# Patient Record
Sex: FEMALE | Race: WHITE | NOT HISPANIC OR LATINO | Employment: FULL TIME | URBAN - METROPOLITAN AREA
[De-identification: names, ages, dates, MRNs, and addresses within clinical notes are randomized per-mention and may not be internally consistent; named-entity substitution may affect disease eponyms.]

---

## 2018-01-25 ENCOUNTER — APPOINTMENT (OUTPATIENT)
Dept: OTHER | Facility: IMAGING CENTER | Age: 23
End: 2018-01-25

## 2018-04-02 ENCOUNTER — OFFICE VISIT (OUTPATIENT)
Dept: URGENT CARE | Facility: CLINIC | Age: 23
End: 2018-04-02
Payer: MEDICAID

## 2018-04-02 VITALS
DIASTOLIC BLOOD PRESSURE: 68 MMHG | BODY MASS INDEX: 18.83 KG/M2 | SYSTOLIC BLOOD PRESSURE: 106 MMHG | WEIGHT: 120 LBS | HEART RATE: 70 BPM | OXYGEN SATURATION: 97 % | HEIGHT: 67 IN | RESPIRATION RATE: 14 BRPM | TEMPERATURE: 99.3 F

## 2018-04-02 DIAGNOSIS — J06.9 URI WITH COUGH AND CONGESTION: ICD-10-CM

## 2018-04-02 DIAGNOSIS — M94.0 COSTOCHONDRITIS: ICD-10-CM

## 2018-04-02 PROCEDURE — 94640 AIRWAY INHALATION TREATMENT: CPT | Performed by: PHYSICIAN ASSISTANT

## 2018-04-02 PROCEDURE — 99203 OFFICE O/P NEW LOW 30 MIN: CPT | Mod: 25 | Performed by: PHYSICIAN ASSISTANT

## 2018-04-02 RX ORDER — BUPRENORPHINE 8 MG/1
TABLET SUBLINGUAL DAILY
COMMUNITY
End: 2018-08-21

## 2018-04-02 RX ORDER — METHYLPREDNISOLONE 4 MG/1
4 TABLET ORAL DAILY
Qty: 1 KIT | Refills: 0 | Status: SHIPPED | OUTPATIENT
Start: 2018-04-02 | End: 2018-08-21

## 2018-04-02 RX ORDER — ALBUTEROL SULFATE 90 UG/1
1-2 AEROSOL, METERED RESPIRATORY (INHALATION) EVERY 4 HOURS PRN
Qty: 1 INHALER | Refills: 0 | Status: SHIPPED | OUTPATIENT
Start: 2018-04-02 | End: 2018-04-07

## 2018-04-02 RX ORDER — ALBUTEROL SULFATE 2.5 MG/3ML
2.5 SOLUTION RESPIRATORY (INHALATION) ONCE
Status: COMPLETED | OUTPATIENT
Start: 2018-04-02 | End: 2018-04-02

## 2018-04-02 RX ORDER — QUETIAPINE FUMARATE 50 MG/1
50 TABLET, FILM COATED ORAL 2 TIMES DAILY
COMMUNITY
End: 2018-08-21

## 2018-04-02 RX ADMIN — ALBUTEROL SULFATE 2.5 MG: 2.5 SOLUTION RESPIRATORY (INHALATION) at 18:37

## 2018-04-02 ASSESSMENT — ENCOUNTER SYMPTOMS
WHEEZING: 1
SPUTUM PRODUCTION: 1
SORE THROAT: 1
MYALGIAS: 0
NAUSEA: 0
EYES NEGATIVE: 1
WEIGHT LOSS: 0
COUGH: 1
HEADACHES: 1
VOMITING: 0
ABDOMINAL PAIN: 0
CHILLS: 1
DIZZINESS: 0

## 2018-04-03 NOTE — PATIENT INSTRUCTIONS
Cough, Adult  Introduction  A cough helps to clear your throat and lungs. A cough may last only 2-3 weeks (acute), or it may last longer than 8 weeks (chronic). Many different things can cause a cough. A cough may be a sign of an illness or another medical condition.  Follow these instructions at home:  · Pay attention to any changes in your cough.  · Take medicines only as told by your doctor.  ¨ If you were prescribed an antibiotic medicine, take it as told by your doctor. Do not stop taking it even if you start to feel better.  ¨ Talk with your doctor before you try using a cough medicine.  · Drink enough fluid to keep your pee (urine) clear or pale yellow.  · If the air is dry, use a cold steam vaporizer or humidifier in your home.  · Stay away from things that make you cough at work or at home.  · If your cough is worse at night, try using extra pillows to raise your head up higher while you sleep.  · Do not smoke, and try not to be around smoke. If you need help quitting, ask your doctor.  · Do not have caffeine.  · Do not drink alcohol.  · Rest as needed.  Contact a doctor if:  · You have new problems (symptoms).  · You cough up yellow fluid (pus).  · Your cough does not get better after 2-3 weeks, or your cough gets worse.  · Medicine does not help your cough and you are not sleeping well.  · You have pain that gets worse or pain that is not helped with medicine.  · You have a fever.  · You are losing weight and you do not know why.  · You have night sweats.  Get help right away if:  · You cough up blood.  · You have trouble breathing.  · Your heartbeat is very fast.  This information is not intended to replace advice given to you by your health care provider. Make sure you discuss any questions you have with your health care provider.  Document Released: 08/30/2012 Document Revised: 05/25/2017 Document Reviewed: 02/24/2016  © 2017 Elsevier  Upper Respiratory Infection, Adult  Most upper respiratory  "infections (URIs) are caused by a virus. A URI affects the nose, throat, and upper air passages. The most common type of URI is often called \"the common cold.\"  Follow these instructions at home:  · Take medicines only as told by your doctor.  · Gargle warm saltwater or take cough drops to comfort your throat as told by your doctor.  · Use a warm mist humidifier or inhale steam from a shower to increase air moisture. This may make it easier to breathe.  · Drink enough fluid to keep your pee (urine) clear or pale yellow.  · Eat soups and other clear broths.  · Have a healthy diet.  · Rest as needed.  · Go back to work when your fever is gone or your doctor says it is okay.  ¨ You may need to stay home longer to avoid giving your URI to others.  ¨ You can also wear a face mask and wash your hands often to prevent spread of the virus.  · Use your inhaler more if you have asthma.  · Do not use any tobacco products, including cigarettes, chewing tobacco, or electronic cigarettes. If you need help quitting, ask your doctor.  Contact a doctor if:  · You are getting worse, not better.  · Your symptoms are not helped by medicine.  · You have chills.  · You are getting more short of breath.  · You have brown or red mucus.  · You have yellow or brown discharge from your nose.  · You have pain in your face, especially when you bend forward.  · You have a fever.  · You have puffy (swollen) neck glands.  · You have pain while swallowing.  · You have white areas in the back of your throat.  Get help right away if:  · You have very bad or constant:  ¨ Headache.  ¨ Ear pain.  ¨ Pain in your forehead, behind your eyes, and over your cheekbones (sinus pain).  ¨ Chest pain.  · You have long-lasting (chronic) lung disease and any of the following:  ¨ Wheezing.  ¨ Long-lasting cough.  ¨ Coughing up blood.  ¨ A change in your usual mucus.  · You have a stiff neck.  · You have changes in " your:  ¨ Vision.  ¨ Hearing.  ¨ Thinking.  ¨ Mood.  This information is not intended to replace advice given to you by your health care provider. Make sure you discuss any questions you have with your health care provider.  Document Released: 06/05/2009 Document Revised: 08/20/2017 Document Reviewed: 03/25/2015  Elsevier Interactive Patient Education © 2017 [employer]vier Inc.

## 2018-04-03 NOTE — PROGRESS NOTES
"Subjective:      Eleanor Burr is a 22 y.o. female who presents with URI (uri symptoms x 2 days .)            HPI   Pt presents with mother for URI which stated yesterday. Deep cough which causes Cp. + sputum clear  + sore throat from coughing.  + sinus congestion  States lost her voice  Denies fever but felt hot yesterday with chills  States had flu vacccine     lmp 3/15/2018      Review of Systems   Constitutional: Positive for chills and malaise/fatigue. Negative for weight loss.   HENT: Positive for congestion and sore throat. Negative for ear pain.    Eyes: Negative.    Respiratory: Positive for cough, sputum production and wheezing.    Cardiovascular: Positive for chest pain.        Cp with coughing   Gastrointestinal: Negative for abdominal pain, nausea and vomiting.   Musculoskeletal: Negative for myalgias.   Skin: Positive for rash.   Neurological: Positive for headaches. Negative for dizziness.   Psychiatric/Behavioral:        On court ordered program  Can not have any products with alcohol, cough meds or narcotics          Objective:     /68   Pulse 70   Temp 37.4 °C (99.3 °F)   Resp 14   Ht 1.702 m (5' 7\")   Wt 54.4 kg (120 lb)   LMP 03/15/2018   SpO2 97%   Breastfeeding? No   BMI 18.79 kg/m²      Physical Exam   Constitutional: She appears well-developed and well-nourished.   HENT:   Head: Normocephalic and atraumatic.   Right Ear: External ear normal.   Left Ear: External ear normal.   Eyes: Conjunctivae are normal. Pupils are equal, round, and reactive to light.   Neck: Normal range of motion.   Cardiovascular: Normal rate, regular rhythm and normal heart sounds.    Pulmonary/Chest:   Initial spo2 97% on RA  Few scattered anterior wheezes. Bases decreased with tight breath sounds    Rt tx x 1--> caused increase in cough, no allergic rx noted  Monitored pt. After 5-10 min. Pt felt better  Able to take deep breaths    No wheezing  Bases clear  spo2 99%   Lymphadenopathy:     She " has no cervical adenopathy.   Vitals reviewed.              Assessment/Plan:     1. URI with cough and congestion  albuterol (PROVENTIL) 2.5mg/3ml nebulizer solution 2.5 mg    albuterol 108 (90 Base) MCG/ACT Aero Soln inhalation aerosol    MethylPREDNISolone (MEDROL DOSEPAK) 4 MG Tablet Therapy Pack   2. Costochondritis       Education done on URI and costochondritis. Hand out given  rx for albuterol inhaler with edu  Medrol dose pack  Stay hydrated  Monitor for fever/chills  F/u prn sooner for worsening symptoms

## 2018-07-14 ENCOUNTER — APPOINTMENT (OUTPATIENT)
Dept: RADIOLOGY | Facility: MEDICAL CENTER | Age: 23
End: 2018-07-14
Attending: EMERGENCY MEDICINE
Payer: MEDICAID

## 2018-07-14 ENCOUNTER — HOSPITAL ENCOUNTER (EMERGENCY)
Facility: MEDICAL CENTER | Age: 23
End: 2018-07-14
Attending: EMERGENCY MEDICINE
Payer: MEDICAID

## 2018-07-14 VITALS
OXYGEN SATURATION: 95 % | RESPIRATION RATE: 14 BRPM | TEMPERATURE: 98.3 F | DIASTOLIC BLOOD PRESSURE: 67 MMHG | BODY MASS INDEX: 16.64 KG/M2 | HEART RATE: 79 BPM | WEIGHT: 106.04 LBS | HEIGHT: 67 IN | SYSTOLIC BLOOD PRESSURE: 95 MMHG

## 2018-07-14 DIAGNOSIS — R63.4 WEIGHT LOSS: ICD-10-CM

## 2018-07-14 DIAGNOSIS — G43.001 MIGRAINE WITHOUT AURA AND WITH STATUS MIGRAINOSUS, NOT INTRACTABLE: ICD-10-CM

## 2018-07-14 LAB
ALBUMIN SERPL BCP-MCNC: 3.8 G/DL (ref 3.2–4.9)
ALBUMIN/GLOB SERPL: 1.2 G/DL
ALP SERPL-CCNC: 73 U/L (ref 30–99)
ALT SERPL-CCNC: 9 U/L (ref 2–50)
AMPHETAMINES UR QL: NEGATIVE
ANION GAP SERPL CALC-SCNC: 6 MMOL/L (ref 0–11.9)
APPEARANCE UR: ABNORMAL
APTT PPP: 29.3 SEC (ref 24.7–36)
AST SERPL-CCNC: 18 U/L (ref 12–45)
BACTERIA #/AREA URNS HPF: ABNORMAL /HPF
BARBITURATES UR QL SCN: NEGATIVE
BASOPHILS # BLD AUTO: 0.5 % (ref 0–1.8)
BASOPHILS # BLD: 0.02 K/UL (ref 0–0.12)
BENZODIAZ UR QL SCN: NEGATIVE
BILIRUB SERPL-MCNC: 0.8 MG/DL (ref 0.1–1.5)
BILIRUB UR QL STRIP.AUTO: NEGATIVE
BUN SERPL-MCNC: 7 MG/DL (ref 8–22)
BZE UR QL SCN: NEGATIVE
CALCIUM SERPL-MCNC: 8.7 MG/DL (ref 8.4–10.2)
CHLORIDE SERPL-SCNC: 107 MMOL/L (ref 96–112)
CO2 SERPL-SCNC: 24 MMOL/L (ref 20–33)
COLOR UR: YELLOW
CREAT SERPL-MCNC: 0.7 MG/DL (ref 0.5–1.4)
EOSINOPHIL # BLD AUTO: 0.21 K/UL (ref 0–0.51)
EOSINOPHIL NFR BLD: 5.2 % (ref 0–6.9)
EPI CELLS #/AREA URNS HPF: ABNORMAL /HPF
ERYTHROCYTE [DISTWIDTH] IN BLOOD BY AUTOMATED COUNT: 39.1 FL (ref 35.9–50)
GLOBULIN SER CALC-MCNC: 3.2 G/DL (ref 1.9–3.5)
GLUCOSE SERPL-MCNC: 95 MG/DL (ref 65–99)
GLUCOSE UR STRIP.AUTO-MCNC: NEGATIVE MG/DL
HCG UR QL: NEGATIVE
HCT VFR BLD AUTO: 34.8 % (ref 37–47)
HGB BLD-MCNC: 12.2 G/DL (ref 12–16)
IMM GRANULOCYTES # BLD AUTO: 0 K/UL (ref 0–0.11)
IMM GRANULOCYTES NFR BLD AUTO: 0 % (ref 0–0.9)
INR PPP: 1.14 (ref 0.87–1.13)
KETONES UR STRIP.AUTO-MCNC: NEGATIVE MG/DL
LEUKOCYTE ESTERASE UR QL STRIP.AUTO: NEGATIVE
LIPASE SERPL-CCNC: 26 U/L (ref 7–58)
LYMPHOCYTES # BLD AUTO: 2 K/UL (ref 1–4.8)
LYMPHOCYTES NFR BLD: 49.1 % (ref 22–41)
MCH RBC QN AUTO: 29.6 PG (ref 27–33)
MCHC RBC AUTO-ENTMCNC: 35.1 G/DL (ref 33.6–35)
MCV RBC AUTO: 84.5 FL (ref 81.4–97.8)
MICRO URNS: ABNORMAL
MONOCYTES # BLD AUTO: 0.32 K/UL (ref 0–0.85)
MONOCYTES NFR BLD AUTO: 7.9 % (ref 0–13.4)
MUCOUS THREADS #/AREA URNS HPF: ABNORMAL /HPF
NEUTROPHILS # BLD AUTO: 1.52 K/UL (ref 2–7.15)
NEUTROPHILS NFR BLD: 37.3 % (ref 44–72)
NITRITE UR QL STRIP.AUTO: NEGATIVE
NRBC # BLD AUTO: 0 K/UL
NRBC BLD-RTO: 0 /100 WBC
PCP UR QL SCN: NEGATIVE
PH UR STRIP.AUTO: 7 [PH]
PLATELET # BLD AUTO: 165 K/UL (ref 164–446)
PMV BLD AUTO: 10.1 FL (ref 9–12.9)
POTASSIUM SERPL-SCNC: 4 MMOL/L (ref 3.6–5.5)
PROT SERPL-MCNC: 7 G/DL (ref 6–8.2)
PROT UR QL STRIP: NEGATIVE MG/DL
PROTHROMBIN TIME: 14.5 SEC (ref 12–14.6)
RBC # BLD AUTO: 4.12 M/UL (ref 4.2–5.4)
RBC UR QL AUTO: NEGATIVE
SODIUM SERPL-SCNC: 137 MMOL/L (ref 135–145)
SP GR UR REFRACTOMETRY: 1.02
SP GR UR STRIP.AUTO: 1.02
TSH SERPL DL<=0.005 MIU/L-ACNC: 1.3 UIU/ML (ref 0.38–5.33)
UNIDENT CRYS URNS QL MICRO: ABNORMAL /HPF
UR OPIATES 2659: NEGATIVE
UR THC 2511T: NEGATIVE
UR TRICYCLIC 2660: NEGATIVE
WBC # BLD AUTO: 4.1 K/UL (ref 4.8–10.8)
WBC #/AREA URNS HPF: ABNORMAL /HPF

## 2018-07-14 PROCEDURE — 81025 URINE PREGNANCY TEST: CPT

## 2018-07-14 PROCEDURE — 85610 PROTHROMBIN TIME: CPT

## 2018-07-14 PROCEDURE — 85025 COMPLETE CBC W/AUTO DIFF WBC: CPT

## 2018-07-14 PROCEDURE — 99284 EMERGENCY DEPT VISIT MOD MDM: CPT

## 2018-07-14 PROCEDURE — 70450 CT HEAD/BRAIN W/O DYE: CPT

## 2018-07-14 PROCEDURE — 80053 COMPREHEN METABOLIC PANEL: CPT

## 2018-07-14 PROCEDURE — 700105 HCHG RX REV CODE 258: Performed by: EMERGENCY MEDICINE

## 2018-07-14 PROCEDURE — 80305 DRUG TEST PRSMV DIR OPT OBS: CPT

## 2018-07-14 PROCEDURE — 96375 TX/PRO/DX INJ NEW DRUG ADDON: CPT

## 2018-07-14 PROCEDURE — 87389 HIV-1 AG W/HIV-1&-2 AB AG IA: CPT

## 2018-07-14 PROCEDURE — 84443 ASSAY THYROID STIM HORMONE: CPT

## 2018-07-14 PROCEDURE — 85730 THROMBOPLASTIN TIME PARTIAL: CPT

## 2018-07-14 PROCEDURE — 83690 ASSAY OF LIPASE: CPT

## 2018-07-14 PROCEDURE — 81001 URINALYSIS AUTO W/SCOPE: CPT | Mod: XU

## 2018-07-14 PROCEDURE — 96374 THER/PROPH/DIAG INJ IV PUSH: CPT

## 2018-07-14 PROCEDURE — 700111 HCHG RX REV CODE 636 W/ 250 OVERRIDE (IP): Performed by: EMERGENCY MEDICINE

## 2018-07-14 RX ORDER — SODIUM CHLORIDE 9 MG/ML
1000 INJECTION, SOLUTION INTRAVENOUS ONCE
Status: COMPLETED | OUTPATIENT
Start: 2018-07-14 | End: 2018-07-14

## 2018-07-14 RX ORDER — KETOROLAC TROMETHAMINE 30 MG/ML
15 INJECTION, SOLUTION INTRAMUSCULAR; INTRAVENOUS ONCE
Status: COMPLETED | OUTPATIENT
Start: 2018-07-14 | End: 2018-07-14

## 2018-07-14 RX ORDER — METOCLOPRAMIDE HYDROCHLORIDE 5 MG/ML
10 INJECTION INTRAMUSCULAR; INTRAVENOUS ONCE
Status: COMPLETED | OUTPATIENT
Start: 2018-07-14 | End: 2018-07-14

## 2018-07-14 RX ORDER — ONDANSETRON 2 MG/ML
4 INJECTION INTRAMUSCULAR; INTRAVENOUS ONCE
Status: COMPLETED | OUTPATIENT
Start: 2018-07-14 | End: 2018-07-14

## 2018-07-14 RX ADMIN — KETOROLAC TROMETHAMINE 15 MG: 30 INJECTION, SOLUTION INTRAMUSCULAR at 20:53

## 2018-07-14 RX ADMIN — METOCLOPRAMIDE 10 MG: 5 INJECTION, SOLUTION INTRAMUSCULAR; INTRAVENOUS at 20:21

## 2018-07-14 RX ADMIN — ONDANSETRON 4 MG: 2 INJECTION, SOLUTION INTRAMUSCULAR; INTRAVENOUS at 20:21

## 2018-07-14 RX ADMIN — SODIUM CHLORIDE 1000 ML: 9 INJECTION, SOLUTION INTRAVENOUS at 20:21

## 2018-07-14 ASSESSMENT — PAIN SCALES - WONG BAKER: WONGBAKER_NUMERICALRESPONSE: HURTS JUST A LITTLE BIT

## 2018-07-15 LAB — HIV 1+2 AB+HIV1 P24 AG SERPL QL IA: NON REACTIVE

## 2018-07-15 NOTE — ED NOTES
.  Chief Complaint   Patient presents with   • Weight Loss     50 pound loss in 2 months   • Migraine     every other day initially and now daily   • Nausea   • Dizziness

## 2018-07-15 NOTE — ED PROVIDER NOTES
"ED Provider Note    CHIEF COMPLAINT  Chief Complaint   Patient presents with   • Weight Loss     50 pound loss in 2 months   • Migraine     every other day initially and now daily   • Nausea   • Dizziness       HPI  Eleanor Burr is a 23 y.o. female who presents 50 pounds weight loss over the last 3 months.  Patient states she is also had accompanying headaches for the last 2 or 3 weeks every day.  These headaches feel like her migraine.  She states that they are worse in the mornings.  They are not accompanied by an aura.  She states she has had migraines in the past and this feels similar they are always located over her right eye.  They are gradual in onset.  She has some nausea with these as well as photophobia.  She denies any difficulty speaking, numbness, tingling, weakness, abdominal pain, vomiting ,fever or chills    REVIEW OF SYSTEMS  Pertinent positives include weight loss, headaches. Pertinent negatives include as above. All other systems negative.      PAST MEDICAL HISTORY   has a past medical history of Anxiety; Cold; Psychiatric disorder; and Snoring.    SOCIAL HISTORY  Social History     Social History Main Topics   • Smoking status: Never Smoker   • Smokeless tobacco: Never Used   • Alcohol use No   • Drug use: Yes     Types: Intravenous      Comment: Herioin   • Sexual activity: Not on file       SURGICAL HISTORY   has a past surgical history that includes tonsillectomy and adenoidectomy (1/3/2015).    CURRENT MEDICATIONS  Home Medications    **Home medications have not yet been reviewed for this encounter**         ALLERGIES  Allergies   Allergen Reactions   • Penicillin G Rash       PHYSICAL EXAM  VITAL SIGNS: BP (!) 95/67   Pulse 79   Temp 36.8 °C (98.3 °F)   Resp 14   Ht 1.702 m (5' 7\")   Wt 48.1 kg (106 lb 0.7 oz)   SpO2 95%   BMI 16.61 kg/m²   Constitutional: Well developed, Well nourished, mild distress.   HENT: Normocephalic, Atraumatic, Oropharynx moist, No oral exudates. "   Eyes: Conjunctiva normal, No discharge.   Neck: Supple, No stridor, no meningismus or lymphadenopathy  Cardiovascular: Normal heart rate, Normal rhythm, No murmurs, equal pulses.   Pulmonary: Normal breath sounds, No respiratory distress, No wheezing, No rales, No rhonchi.  Chest: No chest wall tenderness or deformity.   Abdomen:Soft, No tenderness, No masses, no rebound, no guarding.   Back: No CVA tenderness.   Musculoskeletal: No major deformities noted, No tenderness.   Skin: Warm, Dry, No erythema, No rash.   Neurologic: Alert & oriented x 3, Normal motor function,  No focal deficits noted.   Psychiatric: Affect normal, Judgment normal, Mood normal.       RADIOLOGY/PROCEDURES  CT-HEAD W/O   Final Result         1.  No acute intracranial abnormality.          Laboratory tests  Results for orders placed or performed during the hospital encounter of 07/14/18   HCG QUALITATIVE UR (Lab)   Result Value Ref Range    Beta-Hcg Urine Negative Negative   UR DRUG SCREEN([facility] ONLY)   Result Value Ref Range    Phencyclidine -Pcp Negative Negative    Benzodiazepines Negative Negative    Cocaine Metabolite Negative Negative    Amphetamines By Triage Negative Negative    Urine THC Negative Negative    Codeine-Morphine Negative Negative    Barbiturates Negative Negative    Tricyclic Antidepressants Negative Negative   URINALYSIS,CULTURE IF INDICATED   Result Value Ref Range    Color Yellow     Character Hazy (A)     Specific Gravity 1.020 <1.035    Ph 7.0 5.0 - 8.0    Glucose Negative Negative mg/dL    Ketones Negative Negative mg/dL    Protein Negative Negative mg/dL    Bilirubin Negative Negative    Nitrite Negative Negative    Leukocyte Esterase Negative Negative    Occult Blood Negative Negative    Micro Urine Req Microscopic    REFRACTOMETER SG   Result Value Ref Range    Specific Gravity 1.020    CBC WITH DIFFERENTIAL   Result Value Ref Range    WBC 4.1 (L) 4.8 - 10.8 K/uL    RBC 4.12 (L) 4.20 - 5.40 M/uL     Hemoglobin 12.2 12.0 - 16.0 g/dL    Hematocrit 34.8 (L) 37.0 - 47.0 %    MCV 84.5 81.4 - 97.8 fL    MCH 29.6 27.0 - 33.0 pg    MCHC 35.1 (H) 33.6 - 35.0 g/dL    RDW 39.1 35.9 - 50.0 fL    Platelet Count 165 164 - 446 K/uL    MPV 10.1 9.0 - 12.9 fL    Neutrophils-Polys 37.30 (L) 44.00 - 72.00 %    Lymphocytes 49.10 (H) 22.00 - 41.00 %    Monocytes 7.90 0.00 - 13.40 %    Eosinophils 5.20 0.00 - 6.90 %    Basophils 0.50 0.00 - 1.80 %    Immature Granulocytes 0.00 0.00 - 0.90 %    Nucleated RBC 0.00 /100 WBC    Neutrophils (Absolute) 1.52 (L) 2.00 - 7.15 K/uL    Lymphs (Absolute) 2.00 1.00 - 4.80 K/uL    Monos (Absolute) 0.32 0.00 - 0.85 K/uL    Eos (Absolute) 0.21 0.00 - 0.51 K/uL    Baso (Absolute) 0.02 0.00 - 0.12 K/uL    Immature Granulocytes (abs) 0.00 0.00 - 0.11 K/uL    NRBC (Absolute) 0.00 K/uL   APTT   Result Value Ref Range    APTT 29.3 24.7 - 36.0 sec   PROTHROMBIN TIME   Result Value Ref Range    PT 14.5 12.0 - 14.6 sec    INR 1.14 (H) 0.87 - 1.13   LIPASE   Result Value Ref Range    Lipase 26 7 - 58 U/L   COMP METABOLIC PANEL   Result Value Ref Range    Sodium 137 135 - 145 mmol/L    Potassium 4.0 3.6 - 5.5 mmol/L    Chloride 107 96 - 112 mmol/L    Co2 24 20 - 33 mmol/L    Anion Gap 6.0 0.0 - 11.9    Glucose 95 65 - 99 mg/dL    Bun 7 (L) 8 - 22 mg/dL    Creatinine 0.70 0.50 - 1.40 mg/dL    Calcium 8.7 8.4 - 10.2 mg/dL    AST(SGOT) 18 12 - 45 U/L    ALT(SGPT) 9 2 - 50 U/L    Alkaline Phosphatase 73 30 - 99 U/L    Total Bilirubin 0.8 0.1 - 1.5 mg/dL    Albumin 3.8 3.2 - 4.9 g/dL    Total Protein 7.0 6.0 - 8.2 g/dL    Globulin 3.2 1.9 - 3.5 g/dL    A-G Ratio 1.2 g/dL   TSH   Result Value Ref Range    TSH 1.300 0.380 - 5.330 uIU/mL   URINE MICROSCOPIC (W/UA)   Result Value Ref Range    WBC 2-5 /hpf    Bacteria Moderate (A) None /hpf    Epithelial Cells Many (A) Few /hpf    Mucous Threads Few /hpf    Urine Crystals Few Amorphous /hpf   ESTIMATED GFR   Result Value Ref Range    GFR If  >60 >60  mL/min/1.73 m 2    GFR If Non African American >60 >60 mL/min/1.73 m 2         Medications given in the ER  Medications   NS infusion 1,000 mL (0 mL Intravenous Stopped 7/14/18 2225)   ketorolac (TORADOL) injection 15 mg (15 mg Intravenous Given 7/14/18 2053)   ondansetron (ZOFRAN) syringe/vial injection 4 mg (4 mg Intravenous Given 7/14/18 2021)   metoclopramide (REGLAN) injection 10 mg (10 mg Intravenous Given 7/14/18 2021)       COURSE & MEDICAL DECISION MAKING  Pertinent Labs & Imaging studies reviewed. (See chart for details)  Differential includes malignancy, brain tumor, migraine, electrolyte abnormality.  Hyperthyroidism, HIV  IV fluids were given for migraine cocktail. Patient responded appropriately leaf of her headache.    Patient's IV infiltrated during CT of the chest abdomen pelvis.  Patient refuses all further imaging or replacement of the IV.  I have discussed with her the options of doing a head CT without contrast which may not be ideal since we are looking for possible cause for weight loss such as a tumor.  At this point time patient would rather follow-up as an outpatient with her primary at the [facility] and get the CT done on another day.  Patient has a history of IV drug use.  HIV has been sent out but will not be back tonight.  I will have the patient follow-up with her primary on these results she understands this.      Medical decision making: Patient presents with 50 pounds weight loss in last 2-3 months I was concerned for possible malignancy at this point time her labs do not show any signs of a leukemia.  CT of the head is negative for any mass.  Her headache is gone away with migraine cocktail.  She did not have a fever this headache was not thunderclap in nature I do not think this is subarachnoid hemorrhage or meningitis.  I will have the patient continue to follow-up with her primary for further evaluation with outpatient imaging.    The patient will return for new or worsening  symptoms and is stable at the time of discharge.    The patient is referred to a primary physician for blood pressure management, diabetic screening, and for all other preventative health concerns.        DISPOSITION:  Patient will be discharged home in stable condition.    FOLLOW UP:  [address] [address]  [phone]  Schedule an appointment as soon as possible for a visit in 1 week        OUTPATIENT MEDICATIONS:  Discharge Medication List as of 7/14/2018 10:26 PM          FINAL IMPRESSION  1. Migraine without aura and with status migrainosus, not intractable    2. Weight loss               Electronically signed by: Alberto Rosa, 7/14/2018 7:36 PM  This record was made with a voice recognition software. The software is not perfect. I have tried to correct any grammar, spelling or context errors to the best of my ability, but errors may still remain. Interpretation of this chart should be taken in this context.

## 2018-07-15 NOTE — ED NOTES
Pt returned from ct scan, iv line placed under u/s guidance infiltrated as per ct tech, iv removed , pt refusing any additional iv attempts,ed md made aware, will speak with pt.

## 2018-07-15 NOTE — ED NOTES
Pt dc'd home with instructions for further f/u at [facility], pt verbalized understanding, opportunity provided to ask questions, pt ambulated out of ed with steady gait with mother.

## 2018-07-15 NOTE — DISCHARGE INSTRUCTIONS
He will likely need a further workup with possible CT of the chest abdomen pelvis and possible MRI of your brain to make sure that there is not a cancer causing your weight loss.  Your HIV test is pending have your doctor follow-up on this in 3 days.     Return the emergency department if you have new or different headache, confusion, visual changes, difficulty speaking fever or neck stiffness.      Migraine Headache  A migraine headache is an intense, throbbing pain on one side or both sides of the head. Migraines may also cause other symptoms, such as nausea, vomiting, and sensitivity to light and noise.  What are the causes?  Doing or taking certain things may also trigger migraines, such as:  · Alcohol.  · Smoking.  · Medicines, such as:  ¨ Medicine used to treat chest pain (nitroglycerine).  ¨ Birth control pills.  ¨ Estrogen pills.  ¨ Certain blood pressure medicines.  · Aged cheeses, chocolate, or caffeine.  · Foods or drinks that contain nitrates, glutamate, aspartame, or tyramine.  · Physical activity.  Other things that may trigger a migraine include:  · Menstruation.  · Pregnancy.  · Hunger.  · Stress, lack of sleep, too much sleep, or fatigue.  · Weather changes.  What increases the risk?  The following factors may make you more likely to experience migraine headaches:  · Age. Risk increases with age.  · Family history of migraine headaches.  · Being .  · Depression and anxiety.  · Obesity.  · Being a woman.  · Having a hole in the heart (patent foramen ovale) or other heart problems.  What are the signs or symptoms?  The main symptom of this condition is pulsating or throbbing pain. Pain may:  · Happen in any area of the head, such as on one side or both sides.  · Interfere with daily activities.  · Get worse with physical activity.  · Get worse with exposure to bright lights or loud noises.  Other symptoms may include:  · Nausea.  · Vomiting.  · Dizziness.  · General sensitivity to bright  lights, loud noises, or smells.  Before you get a migraine, you may get warning signs that a migraine is developing (aura). An aura may include:  · Seeing flashing lights or having blind spots.  · Seeing bright spots, halos, or zigzag lines.  · Having tunnel vision or blurred vision.  · Having numbness or a tingling feeling.  · Having trouble talking.  · Having muscle weakness.  How is this diagnosed?  A migraine headache can be diagnosed based on:  · Your symptoms.  · A physical exam.  · Tests, such as CT scan or MRI of the head. These imaging tests can help rule out other causes of headaches.  · Taking fluid from the spine (lumbar puncture) and analyzing it (cerebrospinal fluid analysis, or CSF analysis).  How is this treated?  A migraine headache is usually treated with medicines that:  · Relieve pain.  · Relieve nausea.  · Prevent migraines from coming back.  Treatment may also include:  · Acupuncture.  · Lifestyle changes like avoiding foods that trigger migraines.  Follow these instructions at home:  Medicines  · Take over-the-counter and prescription medicines only as told by your health care provider.  · Do not drive or use heavy machinery while taking prescription pain medicine.  · To prevent or treat constipation while you are taking prescription pain medicine, your health care provider may recommend that you:  ¨ Drink enough fluid to keep your urine clear or pale yellow.  ¨ Take over-the-counter or prescription medicines.  ¨ Eat foods that are high in fiber, such as fresh fruits and vegetables, whole grains, and beans.  ¨ Limit foods that are high in fat and processed sugars, such as fried and sweet foods.  Lifestyle  · Avoid alcohol use.  · Do not use any products that contain nicotine or tobacco, such as cigarettes and e-cigarettes. If you need help quitting, ask your health care provider.  · Get at least 8 hours of sleep every night.  · Limit your stress.  General instructions  · Keep a journal to find  out what may trigger your migraine headaches. For example, write down:  ¨ What you eat and drink.  ¨ How much sleep you get.  ¨ Any change to your diet or medicines.  · If you have a migraine:  ¨ Avoid things that make your symptoms worse, such as bright lights.  ¨ It may help to lie down in a dark, quiet room.  ¨ Do not drive or use heavy machinery.  ¨ Ask your health care provider what activities are safe for you while you are experiencing symptoms.  · Keep all follow-up visits as told by your health care provider. This is important.  Contact a health care provider if:  · You develop symptoms that are different or more severe than your usual migraine symptoms.  Get help right away if:  · Your migraine becomes severe.  · You have a fever.  · You have a stiff neck.  · You have vision loss.  · Your muscles feel weak or like you cannot control them.  · You start to lose your balance often.  · You develop trouble walking.  · You faint.  This information is not intended to replace advice given to you by your health care provider. Make sure you discuss any questions you have with your health care provider.  Document Released: 12/18/2006 Document Revised: 07/07/2017 Document Reviewed: 06/05/2017  [employer] Interactive Patient Education © 2017 [employer] Inc.

## 2018-08-21 ENCOUNTER — HOSPITAL ENCOUNTER (EMERGENCY)
Facility: MEDICAL CENTER | Age: 23
End: 2018-08-22
Attending: EMERGENCY MEDICINE
Payer: MEDICAID

## 2018-08-21 ENCOUNTER — APPOINTMENT (OUTPATIENT)
Dept: RADIOLOGY | Facility: MEDICAL CENTER | Age: 23
End: 2018-08-21
Attending: EMERGENCY MEDICINE
Payer: MEDICAID

## 2018-08-21 DIAGNOSIS — I80.9 THROMBOPHLEBITIS: ICD-10-CM

## 2018-08-21 DIAGNOSIS — I82.622 ACUTE DEEP VEIN THROMBOSIS (DVT) OF RADIAL VEIN OF LEFT UPPER EXTREMITY (HCC): ICD-10-CM

## 2018-08-21 LAB — EKG IMPRESSION: NORMAL

## 2018-08-21 PROCEDURE — 93005 ELECTROCARDIOGRAM TRACING: CPT

## 2018-08-21 PROCEDURE — 99284 EMERGENCY DEPT VISIT MOD MDM: CPT

## 2018-08-21 PROCEDURE — 71045 X-RAY EXAM CHEST 1 VIEW: CPT

## 2018-08-21 PROCEDURE — 93005 ELECTROCARDIOGRAM TRACING: CPT | Performed by: EMERGENCY MEDICINE

## 2018-08-21 RX ORDER — NAPROXEN 500 MG/1
500 TABLET ORAL 2 TIMES DAILY WITH MEALS
COMMUNITY
End: 2018-11-12

## 2018-08-21 RX ORDER — IBUPROFEN 800 MG/1
800 TABLET ORAL EVERY 8 HOURS PRN
COMMUNITY
End: 2018-11-12

## 2018-08-21 RX ORDER — OXCARBAZEPINE 150 MG/1
90 TABLET, FILM COATED ORAL DAILY
COMMUNITY
End: 2020-02-21

## 2018-08-21 ASSESSMENT — PAIN SCALES - GENERAL: PAINLEVEL_OUTOF10: 10

## 2018-08-22 VITALS
HEIGHT: 67 IN | WEIGHT: 103.4 LBS | BODY MASS INDEX: 16.23 KG/M2 | SYSTOLIC BLOOD PRESSURE: 104 MMHG | DIASTOLIC BLOOD PRESSURE: 74 MMHG | TEMPERATURE: 99.4 F | HEART RATE: 72 BPM | OXYGEN SATURATION: 98 % | RESPIRATION RATE: 24 BRPM

## 2018-08-22 PROCEDURE — 93971 EXTREMITY STUDY: CPT

## 2018-08-22 RX ORDER — SULFAMETHOXAZOLE AND TRIMETHOPRIM 800; 160 MG/1; MG/1
1 TABLET ORAL 2 TIMES DAILY
Qty: 14 TAB | Refills: 0 | Status: SHIPPED | OUTPATIENT
Start: 2018-08-22 | End: 2018-08-29

## 2018-08-22 NOTE — ED NOTES
All lines and monitors discontinued. Discharge instructions given, questions answered.    Ambulated out of ER, escorted by RN.  Instructed not to drive after taking pain medication and pt verbalizes understanding.  Rx x 3 given.

## 2018-08-22 NOTE — ED TRIAGE NOTES
[facility]  23 [facility]  Chief Complaint   Patient presents with   • Chest Pain     x 2-3 hours, midsternal, worse with deep inspiration, rates pain 3/10   • Shortness of Breath     concurrent with chest pain   • Arm Swelling     LEFT FOREARM, worsening x 2-3 days, rates pain 10/10, noted to be red/swollen     Ambulatory to triage with steady gait for above.    States that forearm redness/swelling started 3 days ago and has been progressively worsening since. Now today has 10/10 pain to site. 2-3 hours ago developed chest pain/SOB.    States \"I think I have a blood clot. I want to make sure that it didn't travel to my lungs. I wasn't going to come but now I'm really concerned that I have chest pain and shortness of breath.\"    Patient is a daily pack-a-day smoker.    EKG called for in triage per protocol.    Discussed with Charge FLACO Armando.  "

## 2018-08-22 NOTE — ED PROVIDER NOTES
ED Provider Note    Scribed for Ghislaine Awan M.D. by Li Owens. 8/21/2018, 10:13 PM.    Primary care provider: Pcp Pt States None  Means of arrival: walk-in  History obtained from: Patient  History limited by: none    CHIEF COMPLAINT  Chief Complaint   Patient presents with   • Chest Pain     x 2-3 hours, midsternal, worse with deep inspiration, rates pain 3/10   • Shortness of Breath     concurrent with chest pain   • Arm Swelling     LEFT FOREARM, worsening x 2-3 days, rates pain 10/10, noted to be red/swollen       HPI  Eleanor Burr is a 23 y.o. female who presents to the Emergency Department for left forearm swelling, pain and redness that was first noticed this morning. She denies any recent trauma to the extremity. Patient denies currently using IV drugs, however, she has in the past, with her last use being a few years ago. No complaints of fever. Patient is also reporting mild epigastric pain as well, however, she reports that she is a regular smoker, and believes this discomfort to be secondary to that. No complaints of any difficulty breathing.   Patient was diagnosed with a UTI last week, being prescribed with Macrobid, however, she has not started this regimen.     REVIEW OF SYSTEMS  Positives as above. Pertinent negatives include fever, difficulty breathing   All other review of systems are negative    PAST MEDICAL HISTORY   has a past medical history of Anxiety; Cold; Gastric ulcer; Psychiatric disorder; Smoker; and Snoring.    SURGICAL HISTORY   has a past surgical history that includes tonsillectomy and adenoidectomy (1/3/2015) and dental extraction(s).    SOCIAL HISTORY  Social History   Substance Use Topics   • Smoking status: Current Every Day Smoker     Packs/day: 1.00     Types: Cigarettes   • Smokeless tobacco: Never Used   • Alcohol use No      History   Drug Use No       FAMILY HISTORY  History reviewed. No pertinent family history.    CURRENT MEDICATIONS  Reviewed. See  "Encounter Summary.     ALLERGIES  Allergies   Allergen Reactions   • Penicillin G Rash       PHYSICAL EXAM  VITAL SIGNS: /74   Pulse 72   Temp 37.4 °C (99.4 °F)   Resp 20   Ht 1.702 m (5' 7\")   Wt 46.9 kg (103 lb 6.3 oz)   SpO2 98%   BMI 16.19 kg/m²     Pulse ox interpretation: I interpret this pulse ox as normal.  Constitutional: Alert in no apparent distress.  HENT: Normocephalic atraumatic, MMM  Eyes: PERR, Conjunctiva normal, Non-icteric.   Neck: Normal range of motion, No tenderness, Supple, No stridor.   Cardiovascular: Regular rate and rhythm, no murmurs.   Thorax & Lungs: Normal breath sounds, No respiratory distress, No wheezing, No chest tenderness.   Abdomen:Soft, No tenderness, No pulsatile masses. No peritoneal signs.  Skin: Warm, Dry  Extremities: Intact distal pulses, left forearm ferguson aspect, hard superficial probable occluded vein, with surrounding erythema, warmth and induration, No cyanosis  Musculoskeletal: Good range of motion in all major joints.  Neurologic: Alert and oriented, No focal deficits noted.     DIFFERENTIAL DIAGNOSIS AND WORK UP PLAN    10:13 PM Patient seen and examined at bedside. The patient presents with redness, pain and swelling along the left forearm and the differential diagnosis includes but is not limited to thrombophlebitis, cellulitis, abscess, low concern for PE or ACS. Ordered for UE venous duplex, chest xray to evaluate. I informed the patient that we would evaluate for acute origins of her symptoms with imaging. She understands and agrees. Patient denies any difficulty breathing, and her chest discomfort is more in her epigastric area, supported by her as being regular discomfort experienced secondary to her heavy smoking. Patient is PERC negative at this time.  She is refusing any lab work.    DIAGNOSTIC STUDIES / PROCEDURES     EKG  12- Lead EKG; interpreted by myself - Emperatriz  Normal sinus rhythm with a rate of 74 bpm.   Normal axis.  Normal " "intervals.   No ST elevation or depression, or abnormal T wave inversion   No widening of QRS complex   Good R wave progression   No diagnostic Q waves.   prior EKG T wave inversions V2 and V3 are not present today  Clinical Impression: normal EKG       RADIOLOGY  UE VENOUS DUPLEX (Specify in Comments Left, Right Or Bilateral)         DX-CHEST-LIMITED (1 VIEW)   Final Result         1.  No acute cardiopulmonary disease.        The radiologist's interpretation of all radiological studies have been reviewed by me.    COURSE & MEDICAL DECISION MAKING  Pertinent Labs & Imaging studies reviewed. (See chart for details)    12:39 AM I re-evaluated patient at bedside and informed her of her results. I advised that we should continue work up for PE, however, patient is refusing any lab work or admission at this time. Patient is experiencing no chest pain or shortness of breath currently, she is not tachycardic or hypoxic.  She is continuing to refuse any type of IV treatment or workup including labs or CT scan at this time, patient will be placed on blood thinners as well as antibiotics and advised warm compresses at home. He understands and agrees with treatment plan and discharge.    This is a 23 y.o. year old female who presents with normal vital signs and a history of IV drug use within the last 5 years for evaluation of redness, swelling and pain along the left forearm, first noticed this morning. Patient's US shows deep and peripheral occlusions with thrombophlebitis. She is refusing admission or further lab work up for possible PE, I counseled her on the risks of this, however, she still refuses. Patient will be placed on blood thinners to begin management, as well as antibiotics for the current infection. She will be discharge given strict return precautions and follow up with PCP for tomorrow.    /88   Pulse 91   Temp 37.4 °C (99.4 °F)   Resp (!) 24   Ht 1.702 m (5' 7\")   Wt 46.9 kg (103 lb 6.3 oz)   " SpO2 98%   BMI 16.19 kg/m²        The patient will return for new or worsening symptoms and is stable at the time of discharge.    DISPOSITION:  Patient will be discharged home in stable condition.    FOLLOW UP:  with your primary care department    Schedule an appointment as soon as possible for a visit  call later this morning    [facility], Emergency Dept  [address]  [place] [id number] [phone]    If symptoms worsen - chest pain difficulty breathing      OUTPATIENT MEDICATIONS:  Discharge Medication List as of 8/22/2018 12:30 AM      START taking these medications    Details   sulfamethoxazole-trimethoprim (BACTRIM DS) 800-160 MG tablet Take 1 Tab by mouth 2 times a day for 7 days., Disp-14 Tab, R-0, Print Rx Paper      !! rivaroxaban (XARELTO) 15 MG Tab tablet Take 1 Tab by mouth 2 times a day, with meals for 21 days., Disp-42 Tab, R-0, Print Rx Paper      !! rivaroxaban (XARELTO) 20 MG Tab tablet Take 1 Tab by mouth with dinner for 30 days.To be started after 15mg twice a day is completedDisp-30 Tab, R-0, Print Rx Paper       !! - Potential duplicate medications found. Please discuss with provider.          FINAL IMPRESSION  1. Thrombophlebitis    2. Acute deep vein thrombosis (DVT) of radial vein of left upper extremity (HCC)          Li FRAIRE (Scribe), am scribing for, and in the presence of, Ghislaine Awan M.D..    Electronically signed by: Li Owens ([place]), 8/21/2018    Ghislaine FRAIRE M.D. personally performed the services described in this documentation, as scribed by Li Owens in my presence, and it is both accurate and complete.    The note accurately reflects work and decisions made by me.  Ghislaine Awan  8/22/2018  4:23 AM    This dictation has been created using voice recognition software and/or scribes. The accuracy of the dictation is limited by the abilities of the software and the expertise of the scribes. I expect there may  be some errors of grammar and possibly content. I made every attempt to manually correct the errors within my dictation. However, errors related to voice recognition software and/or scribes may still exist and should be interpreted within the appropriate context.

## 2018-08-22 NOTE — ED NOTES
Pt refusing IV, threatening to leave AMA. RN attempted to educate pt on need for IV and blood work. PT still refusing.

## 2018-08-22 NOTE — DISCHARGE INSTRUCTIONS
Deep Vein Thrombosis  A deep vein thrombosis (DVT) is a blood clot (thrombus) that usually occurs in a deep, larger vein of the lower leg or the pelvis, or in an upper extremity such as the arm. These are dangerous and can lead to serious and even life-threatening complications if the clot travels to the lungs.  A DVT can damage the valves in your leg veins so that instead of flowing upward, the blood pools in the lower leg. This is called post-thrombotic syndrome, and it can result in pain, swelling, discoloration, and sores on the leg.  What are the causes?  A DVT is caused by the formation of a blood clot in your leg, pelvis, or arm. Usually, several things contribute to the formation of blood clots. A clot may develop when:  · Your blood flow slows down.  · Your vein becomes damaged in some way.  · You have a condition that makes your blood clot more easily.  What increases the risk?  A DVT is more likely to develop in:  · People who are older, especially over 60 years of age.  · People who are overweight (obese).  · People who sit or lie still for a long time, such as during long-distance travel (over 4 hours), bed rest, hospitalization, or during recovery from certain medical conditions like a stroke.  · People who do not engage in much physical activity (sedentary lifestyle).  · People who have chronic breathing disorders.  · People who have a personal or family history of blood clots or blood clotting disease.  · People who have peripheral vascular disease (PVD), diabetes, or some types of cancer.  · People who have heart disease, especially if the person had a recent heart attack or has congestive heart failure.  · People who have neurological diseases that affect the legs (leg paresis).  · People who have had a traumatic injury, such as breaking a hip or leg.  · People who have recently had major or lengthy surgery, especially on the hip, knee, or abdomen.  · People who have had a central line placed  inside a large vein.  · People who take medicines that contain the hormone estrogen. These include birth control pills and hormone replacement therapy.  · Pregnancy or during childbirth or the postpartum period.  · Long plane flights (over 8 hours).  What are the signs or symptoms?     Symptoms of a DVT can include:  · Swelling of your leg or arm, especially if one side is much worse.  · Warmth and redness of your leg or arm, especially if one side is much worse.  · Pain in your arm or leg. If the clot is in your leg, symptoms may be more noticeable or worse when you stand or walk.  · A feeling of pins and needles, if the clot is in the arm.  The symptoms of a DVT that has traveled to the lungs (pulmonary embolism, PE) usually start suddenly and include:  · Shortness of breath while active or at rest.  · Coughing or coughing up blood or blood-tinged mucus.  · Chest pain that is often worse with deep breaths.  · Rapid or irregular heartbeat.  · Feeling light-headed or dizzy.  · Fainting.  · Feeling anxious.  · Sweating.  There may also be pain and swelling in a leg if that is where the blood clot started.  These symptoms may represent a serious problem that is an emergency. Do not wait to see if the symptoms will go away. Get medical help right away. Call your local emergency services (911 in the [place] Do not drive yourself to the hospital.   How is this diagnosed?  Your health care provider will take a medical history and perform a physical exam. You may also have other tests, including:  · Blood tests to assess the clotting properties of your blood.  · Imaging tests, such as CT, ultrasound, MRI, X-ray, and other tests to see if you have clots anywhere in your body.  How is this treated?  After a DVT is identified, it can be treated. The type of treatment that you receive depends on many factors, such as the cause of your DVT, your risk for bleeding or developing more clots, and other medical conditions that you  have. Sometimes, a combination of treatments is necessary. Treatment options may be combined and include:  · Monitoring the blood clot with ultrasound.  · Taking medicines by mouth, such as newer blood thinners (anticoagulants), thrombolytics, or warfarin.  · Taking anticoagulant medicine by injection or through an IV tube.  · Wearing compression stockings or using different types of devices.  · Surgery (rare) to remove the blood clot or to place a filter in your abdomen to stop the blood clot from traveling to your lungs.  Treatments for a DVT are often divided into immediate treatment and long-term treatment (up to 3 months after DVT). You can work with your health care provider to choose the treatment program that is best for you.  Follow these instructions at home:  If you are taking a newer oral anticoagulant:  · Take the medicine every single day at the same time each day.  · Understand what foods and drugs interact with this medicine.  · Understand that there are no regular blood tests required when using this medicine.  · Understand the side effects of this medicine, including excessive bruising or bleeding. Ask your health care provider or pharmacist about other possible side effects.  If you are taking warfarin:  · Understand how to take warfarin and know which foods can affect how warfarin works in your body.  · Understand that it is dangerous to take too much or too little warfarin. Too much warfarin increases the risk of bleeding. Too little warfarin continues to allow the risk for blood clots.  · Follow your PT and INR blood testing schedule. The PT and INR results allow your health care provider to adjust your dose of warfarin. It is very important that you have your PT and INR tested as often as told by your health care provider.  · Avoid major changes in your diet, or tell your health care provider before you change your diet. Arrange a visit with a registered dietitian to answer your questions.  Many foods, especially foods that are high in vitamin K, can interfere with warfarin and affect the PT and INR results. Eat a consistent amount of foods that are high in vitamin K, such as:  ¨ Spinach, kale, broccoli, cabbage, leticia greens, turnip greens, [place] sprouts, peas, cauliflower, seaweed, and parsley.  ¨ Beef liver and pork liver.  ¨ Green tea.  ¨ Soybean oil.  · Tell your health care provider about any and all medicines, vitamins, and supplements that you take, including aspirin and other over-the-counter anti-inflammatory medicines. Be especially cautious with aspirin and anti-inflammatory medicines. Do not take those before you ask your health care provider if it is safe to do so. This is important because many medicines can interfere with warfarin and affect the PT and INR results.  · Do not start or stop taking any over-the-counter or prescription medicine unless your health care provider or pharmacist tells you to do so.  If you take warfarin, you will also need to do these things:  · Hold pressure over cuts for longer than usual.  · Tell your dentist and other health care providers that you are taking warfarin before you have any procedures in which bleeding may occur.  · Avoid alcohol or drink very small amounts. Tell your health care provider if you change your alcohol intake.  · Do not use tobacco products, including cigarettes, chewing tobacco, and e-cigarettes. If you need help quitting, ask your health care provider.  · Avoid contact sports.  General instructions  · Take over-the-counter and prescription medicines only as told by your health care provider. Anticoagulant medicines can have side effects, including easy bruising and difficulty stopping bleeding. If you are prescribed an anticoagulant, you will also need to do these things:  ¨ Hold pressure over cuts for longer than usual.  ¨ Tell your dentist and other health care providers that you are taking anticoagulants before you have  any procedures in which bleeding may occur.  ¨ Avoid contact sports.  · Wear a medical alert bracelet or carry a medical alert card that says you have had a PE.  · Ask your health care provider how soon you can go back to your normal activities. Stay active to prevent new blood clots from forming.  · Make sure to exercise while traveling or when you have been sitting or standing for a long period of time. It is very important to exercise. Exercise your legs by walking or by tightening and relaxing your leg muscles often. Take frequent walks.  · Wear compression stockings as told by your health care provider to help prevent more blood clots from forming.  · Do not use tobacco products, including cigarettes, chewing tobacco, and e-cigarettes. If you need help quitting, ask your health care provider.  · Keep all follow-up appointments with your health care provider. This is important.  How is this prevented?  Take these actions to decrease your risk of developing another DVT:  · Exercise regularly. For at least 30 minutes every day, engage in:  ¨ Activity that involves moving your arms and legs.  ¨ Activity that encourages good blood flow through your body by increasing your heart rate.  · Exercise your arms and legs every hour during long-distance travel (over 4 hours). Drink plenty of water and avoid drinking alcohol while traveling.  · Avoid sitting or lying in bed for long periods of time without moving your legs.  · Maintain a weight that is appropriate for your height. Ask your health care provider what weight is healthy for you.  · If you are a woman who is over 35 years of age, avoid unnecessary use of medicines that contain estrogen. These include birth control pills.  · Do not smoke, especially if you take estrogen medicines. If you need help quitting, ask your health care provider.  If you are hospitalized, prevention measures may include:  · Early walking after surgery, as soon as your health care provider  says that it is safe.  · Receiving anticoagulants to prevent blood clots. If you cannot take anticoagulants, other options may be available, such as wearing compression stockings or using different types of devices.  Get help right away if:  · You have new or increased pain, swelling, or redness in an arm or leg.  · You have numbness or tingling in an arm or leg.  · You have shortness of breath while active or at rest.  · You have chest pain.  · You have a rapid or irregular heartbeat.  · You feel light-headed or dizzy.  · You cough up blood.  · You notice blood in your vomit, bowel movement, or urine.  These symptoms may represent a serious problem that is an emergency. Do not wait to see if the symptoms will go away. Get medical help right away. Call your local emergency services (911 in the [place] Do not drive yourself to the hospital.   This information is not intended to replace advice given to you by your health care provider. Make sure you discuss any questions you have with your health care provider.  Document Released: 12/18/2006 Document Revised: 05/25/2017 Document Reviewed: 04/13/2016  [employer] Interactive Patient Education © 2017 [employer]

## 2018-11-12 ENCOUNTER — OFFICE VISIT (OUTPATIENT)
Dept: MEDICAL GROUP | Facility: MEDICAL CENTER | Age: 23
End: 2018-11-12
Attending: FAMILY MEDICINE
Payer: MEDICAID

## 2018-11-12 VITALS
HEART RATE: 60 BPM | DIASTOLIC BLOOD PRESSURE: 60 MMHG | BODY MASS INDEX: 15.7 KG/M2 | TEMPERATURE: 97.7 F | RESPIRATION RATE: 14 BRPM | HEIGHT: 67 IN | OXYGEN SATURATION: 97 % | WEIGHT: 100 LBS | SYSTOLIC BLOOD PRESSURE: 100 MMHG

## 2018-11-12 DIAGNOSIS — Z72.0 TOBACCO ABUSE: ICD-10-CM

## 2018-11-12 DIAGNOSIS — M51.26 LUMBAR DISC HERNIATION: ICD-10-CM

## 2018-11-12 DIAGNOSIS — B18.2 HEP C W/ COMA, CHRONIC: ICD-10-CM

## 2018-11-12 DIAGNOSIS — M79.89 SWELLING OF BOTH UPPER EXTREMITIES: ICD-10-CM

## 2018-11-12 DIAGNOSIS — Z87.898 HISTORY OF SEIZURES: ICD-10-CM

## 2018-11-12 DIAGNOSIS — G43.009 MIGRAINE WITHOUT AURA AND WITHOUT STATUS MIGRAINOSUS, NOT INTRACTABLE: ICD-10-CM

## 2018-11-12 DIAGNOSIS — R10.9 ABDOMINAL PAIN, UNSPECIFIED ABDOMINAL LOCATION: ICD-10-CM

## 2018-11-12 DIAGNOSIS — I82.622: ICD-10-CM

## 2018-11-12 PROCEDURE — 99213 OFFICE O/P EST LOW 20 MIN: CPT | Performed by: FAMILY MEDICINE

## 2018-11-12 PROCEDURE — 99204 OFFICE O/P NEW MOD 45 MIN: CPT | Performed by: FAMILY MEDICINE

## 2018-11-12 RX ORDER — GABAPENTIN 100 MG/1
100 CAPSULE ORAL 3 TIMES DAILY
Qty: 90 CAP | Refills: 3 | Status: SHIPPED | OUTPATIENT
Start: 2018-11-12 | End: 2020-02-21

## 2018-11-12 RX ORDER — RANITIDINE 150 MG/1
150 TABLET ORAL 2 TIMES DAILY
Qty: 60 TAB | Refills: 11 | Status: SHIPPED | OUTPATIENT
Start: 2018-11-12

## 2018-11-12 RX ORDER — BUTALBITAL, ACETAMINOPHEN AND CAFFEINE 50; 325; 40 MG/1; MG/1; MG/1
1-2 TABLET ORAL EVERY 4 HOURS PRN
Qty: 60 TAB | Refills: 0 | Status: SHIPPED | OUTPATIENT
Start: 2018-11-12 | End: 2018-12-12

## 2018-11-12 RX ORDER — NICOTINE 21 MG/24HR
1 PATCH, TRANSDERMAL 24 HOURS TRANSDERMAL EVERY 24 HOURS
Qty: 30 PATCH | Refills: 3 | Status: SHIPPED | OUTPATIENT
Start: 2018-11-12 | End: 2020-02-21

## 2018-11-12 ASSESSMENT — ENCOUNTER SYMPTOMS
HEARTBURN: 1
COUGH: 0
NECK PAIN: 0
SHORTNESS OF BREATH: 0
VOMITING: 0
DEPRESSION: 0
ABDOMINAL PAIN: 1
POLYDIPSIA: 0
SPUTUM PRODUCTION: 0
TINGLING: 1
NAUSEA: 0
FEVER: 0
HEADACHES: 1
BACK PAIN: 1
DIARRHEA: 0
INSOMNIA: 0
PALPITATIONS: 0
EYES NEGATIVE: 1
CONSTIPATION: 0
BRUISES/BLEEDS EASILY: 0
CHILLS: 0
NERVOUS/ANXIOUS: 1
HALLUCINATIONS: 0

## 2018-11-12 ASSESSMENT — PATIENT HEALTH QUESTIONNAIRE - PHQ9: CLINICAL INTERPRETATION OF PHQ2 SCORE: 0

## 2018-11-12 ASSESSMENT — LIFESTYLE VARIABLES: SUBSTANCE_ABUSE: 0

## 2018-11-12 NOTE — LETTER
[facility]  Mike Britt M.D.  [address]  [place] [address]  Fax: [phone]   Authorization for Release/Disclosure of   Protected Health Information   Name: ELEANOR BURR : 1995 SSN: [id number]   Address: [address] [place] [address] [place] [address] Phone:    [phone] (home)    I authorize the entity listed below to release/disclose the PHI below to:   [facility]/Mike Britt M.D. and Mike Britt M.D.   Provider or Entity Name:  [facility]     Address   City, [facility], [facility]   Phone:      Fax: [phone]     Reason for request: continuity of care   Information to be released:    [  ] LAST COLONOSCOPY,  including any PATH REPORT and follow-up  [  ] LAST FIT/COLOGUARD RESULT [  ] LAST DEXA  [  ] LAST MAMMOGRAM  [  ] LAST PAP  [  ] LAST LABS [  ] RETINA EXAM REPORT  [  ] IMMUNIZATION RECORDS  [ x ] Release all info      [  ] Check here and initial the line next to each item to release ALL health information INCLUDING  _____ Care and treatment for drug and / or alcohol abuse  _____ HIV testing, infection status, or AIDS  _____ Genetic Testing    DATES OF SERVICE OR TIME PERIOD TO BE DISCLOSED: _____________  I understand and acknowledge that:  * This Authorization may be revoked at any time by you in writing, except if your health information has already been used or disclosed.  * Your health information that will be used or disclosed as a result of you signing this authorization could be re-disclosed by the recipient. If this occurs, your re-disclosed health information may no longer be protected by State or Federal laws.  * You may refuse to sign this Authorization. Your refusal will not affect your ability to obtain treatment.  * This Authorization becomes effective upon signing and will  on (date) __________.      If no date is indicated, this Authorization will  one (1) year from the signature date.    Name: Eleanor Burr    Signature:   Date:     2018       PLEASE FAX  REQUESTED RECORDS BACK TO: [phone]

## 2018-11-12 NOTE — PROGRESS NOTES
Subjective:      Eleanor Burr is a 23 y.o. female who presents with [facility] and Hand Swelling (bilateral)            Patient 23-year-old female here to establish with the clinic today.  [facility]  She has multiple medical problems including a history of a DVT in her upper extremity.  She had been on Xarelto until 2 weeks ago when she ran out of her medication.  We will have her restart her Xarelto.  Since having stopped her Xarelto she has noticed swelling in her hands bilaterally when she wakes up in the morning.  She has not had any chest pain, shortness of breath or fevers.  She also mentions having swelling in her bilateral lower extremities that have since resolved.  Since she is having continued swelling in her upper extremities and has a history of a DVT we will recheck upper extremity Dopplers on both arms.  We will also refer to the anticoagulation monitoring clinic and hematology for possible coagulopathies.  We will also order an Antithrombin, protein C and S and factor V Leiden and also repeat other blood work including a complete blood count and complete metabolic panel.    She has also been getting recurrent headaches primarily on the left side of her head.  She has been worked up in the past for her migraines with an MRI and a CT scan.  The results of the CT scan is as follows:    1.  No acute intracranial abnormality.    She states she has been placed on medications in the past but had no success with the medications.  She remembers being placed on Topamax in the past which she said made her headaches worse.  She has been using NSAIDs for her headaches which sometimes work.  We will have her try Fioricet instead of her NSAIDs since we will be restarting her Xarelto.  She has been advised to not use her NSAIDs with Xarelto due to the blood thinning effects of the NSAIDs.  She also has a history of gastroesophageal reflux disease so we will be  restarting her ranitidine which did work for her.  We will continue to follow.  She also mentions having history of seizures, but she has not had a seizure in some time now.  She is currently on Trileptal for her anxiety disorder, which may also be treating her seizure disorder.  Discussed possibly referring her to neurology if her headaches persist or her seizures return.  ER precautions have also been given to patient.    She also has a history of a herniated disc in her lumbar spine.  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  She has not had any recent x-rays or MRIs of her back, but will order an x-ray of her back for a further evaluation.  Discussed possibly later getting a MRI of her back done also.  She has been going to physical therapy for her back, but will also have her try Neurontin at 100 mg 3 times daily we will continue to follow.    She is a smoker so she has been advised to stop smoking since she has some sort of coagulopathy causing DVTs.  We will have her try nicotine patches at 21 mg.  She is also been advised to attend support groups or classes on smoking cessation in the community.    She also mentions recurrent abdominal pains that occur for no reason.  She does have a history of gastroesophageal reflux disease and hepatitis C as well.  She states that while a patient at the [facility] clinic she was told that her hepatitis C levels were undetectable despite having hep C antibodies.  We will recheck her hep C antibodies and also get an ultrasound of her abdomen for further evaluation.  We will continue to follow.  She also has a history of irregular periods she states that ever since she has lost a significant amount of weight her periods have become irregular.  We will continue to follow.    Her past surgical history includes a tonsillectomy and adenoidectomy in 2014 and in 2018 she had her teeth  "extracted.    Her family medical history is significant for thyroid cancer in her mother, and head and neck cancer in her father.  Her sister has diabetes and her grandmother has lupus.    She is currently unemployed and single.    She denies drinking alcohol, but smokes a pack of tobacco a day.  She denies other substance use.        Review of Systems   Constitutional: Negative for chills and fever.   HENT: Negative for ear pain, hearing loss and tinnitus.    Eyes: Negative.    Respiratory: Negative for cough, sputum production and shortness of breath.    Cardiovascular: Negative for chest pain and palpitations.   Gastrointestinal: Positive for abdominal pain and heartburn. Negative for constipation, diarrhea, nausea and vomiting.   Genitourinary: Negative.    Musculoskeletal: Positive for back pain and joint pain. Negative for neck pain.        B hand swelling   Skin: Negative for rash.   Neurological: Positive for tingling and headaches.   Endo/Heme/Allergies: Negative for environmental allergies and polydipsia. Does not bruise/bleed easily.   Psychiatric/Behavioral: Negative for depression, hallucinations, substance abuse and suicidal ideas. The patient is nervous/anxious. The patient does not have insomnia.           Objective:     /60 (BP Location: Left arm, Patient Position: Sitting)   Pulse 60   Temp 36.5 °C (97.7 °F)   Resp 14   Ht 1.702 m (5' 7\")   Wt 45.4 kg (100 lb)   SpO2 97%   BMI 15.66 kg/m²      Physical Exam   Constitutional: She is oriented to person, place, and time.   BMI 15     HENT:   Head: Normocephalic and atraumatic.   Nose: Nose normal.   Mouth/Throat: Oropharynx is clear and moist.   congestion   Eyes: Pupils are equal, round, and reactive to light. Conjunctivae and EOM are normal.   Neck: Normal range of motion. Neck supple. No thyromegaly present.   Cardiovascular: Normal rate and regular rhythm.  Exam reveals no friction rub.    No murmur heard.  Pulmonary/Chest: Effort " normal and breath sounds normal. No respiratory distress. She has no wheezes. She has no rales.   Abdominal: Soft. Bowel sounds are normal. She exhibits no distension.   Musculoskeletal: She exhibits tenderness. She exhibits no edema.   Lymphadenopathy:     She has cervical adenopathy.   Neurological: She is alert and oriented to person, place, and time. No cranial nerve deficit or sensory deficit.   Skin: Skin is warm and dry.   Psychiatric: She has a normal mood and affect. Her behavior is normal.   Nursing note and vitals reviewed.              Assessment/Plan:     1. Deep vein thrombosis (DVT) of radial vein of left upper extremity, unspecified chronicity (HCC)  We will have her repeat her Doppler ultrasounds of her upper extremities bilaterally.  She will also restart her Xarelto to be used as initially directed.  A referral to hematology as well as anticoagulation monitoring clinic will also be made today.  Blood work to further evaluate for coagulopathy has been ordered.  ER precautions have been given if she should develop any sudden chest pains, shortness of breath bleeding problems or other neurologic issues.  We will continue to follow.  - rivaroxaban (XARELTO) 20 MG Tab tablet; Take 1 Tab by mouth with dinner for 30 days.  Dispense: 30 Tab; Refill: 3  - REFERRAL TO ANTICOAGULATION MONITORING  - REFERRAL TO HEMATOLOGY ONCOLOGY Referral to? [facility]  - ANTITHROMBIN PANEL (ARBILL); Future  - PROTEIN C/S PANEL FUNCTIONAL; Future  - FACTOR V LEIDEN MUTATION; Future  - COMP METABOLIC PANEL; Future  - CBC WITH DIFFERENTIAL; Future    2. Swelling of both upper extremities  See above plan.  - rivaroxaban (XARELTO) 20 MG Tab tablet; Take 1 Tab by mouth with dinner for 30 days.  Dispense: 30 Tab; Refill: 3  - REFERRAL TO ANTICOAGULATION MONITORING  - REFERRAL TO HEMATOLOGY ONCOLOGY Referral to? [facility]  - ANTITHROMBIN PANEL (ARBILL); Future  - PROTEIN C/S PANEL FUNCTIONAL; Future  - FACTOR V LEIDEN  MUTATION; Future  - COMP METABOLIC PANEL; Future  - CBC WITH DIFFERENTIAL; Future    3. Tobacco abuse  We will have her start nicotine patches and attend smoking cessation classes or support groups in the community.    4. Migraine without aura and without status migrainosus, not intractable  We will have her try Fioricet as directed for her headaches.  If she continues to have persistent headaches discussed a possible referral to neurology.  Reviewed the results of her recent CT scan which was within normal limits.  ER precautions were given if she should have any sudden worsening of her current symptoms.  - acetaminophen/caffeine/butalbital 325-40-50 mg (FIORICET) -40 MG Tab; Take 1-2 Tabs by mouth every four hours as needed for Headache or Migraine for up to 30 days.  Dispense: 60 Tab; Refill: 0    5. History of seizures  We will have her continue to use her Trileptal as directed.  Discussed neurology if she should develop any worsening of her seizure activity.  ER precautions have been given to patient.    6. Lumbar disc herniation  We will have her continue to follow-up with her physical therapist.  An x-ray of her lower back has been ordered.  We will also start Neurontin at 100 mg 3 times daily.  We will continue to follow.  - DX-LUMBAR SPINE-2 OR 3 VIEWS; Future    7. Abdominal pain, unspecified abdominal location  An ultrasound of her abdomen has been ordered.  We will have her get blood work done to further evaluate her liver enzymes as well as check an hep C antibody.  Discussed GI for a further evaluation and possible assistance in management of her recurrent abdominal pain as well as history of hep C.  - US-ABDOMEN COMPLETE SURVEY; Future  - HEP C VIRUS ANTIBODY; Future    8. Hep C w/ coma, chronic (HCC)  See above plan.  - US-ABDOMEN COMPLETE SURVEY; Future  - HEP C VIRUS ANTIBODY; Future  .

## 2018-11-19 ENCOUNTER — TELEPHONE (OUTPATIENT)
Dept: VASCULAR LAB | Facility: MEDICAL CENTER | Age: 23
End: 2018-11-19

## 2018-11-19 NOTE — TELEPHONE ENCOUNTER
Called and left msg for pt to call back to establish care regarding Anticoagulation referral for S[place] from Dr. Mike Britt on 11/12/18    [facility] at [phone], fax [phone]    Maryann Pizarro, MadisonD

## 2018-11-27 ENCOUNTER — TELEPHONE (OUTPATIENT)
Dept: MEDICAL GROUP | Facility: MEDICAL CENTER | Age: 23
End: 2018-11-27

## 2018-11-27 NOTE — TELEPHONE ENCOUNTER
Left message with patient about no show to appointment today 11/27/18.  Explained that this was her first no show and the no show policy.

## 2018-12-04 ENCOUNTER — TELEPHONE (OUTPATIENT)
Dept: VASCULAR LAB | Facility: MEDICAL CENTER | Age: 23
End: 2018-12-04

## 2018-12-04 NOTE — TELEPHONE ENCOUNTER
Left message to reschedule patient due to [facility] not [facility].

## 2018-12-10 ENCOUNTER — TELEPHONE (OUTPATIENT)
Dept: VASCULAR LAB | Facility: MEDICAL CENTER | Age: 23
End: 2018-12-10

## 2018-12-19 ENCOUNTER — TELEPHONE (OUTPATIENT)
Dept: VASCULAR LAB | Facility: MEDICAL CENTER | Age: 23
End: 2018-12-19

## 2018-12-19 NOTE — TELEPHONE ENCOUNTER
[facility]     for pt to call clinic and establish care.  Sent letter of compliance.    Jorden Perdomo, PharmD

## 2020-02-21 ENCOUNTER — OFFICE VISIT (OUTPATIENT)
Dept: MEDICAL GROUP | Facility: MEDICAL CENTER | Age: 25
End: 2020-02-21
Attending: INTERNAL MEDICINE
Payer: MEDICAID

## 2020-02-21 VITALS
HEART RATE: 68 BPM | RESPIRATION RATE: 16 BRPM | HEIGHT: 67 IN | BODY MASS INDEX: 16.64 KG/M2 | TEMPERATURE: 98.3 F | WEIGHT: 106 LBS | DIASTOLIC BLOOD PRESSURE: 56 MMHG | SYSTOLIC BLOOD PRESSURE: 98 MMHG | OXYGEN SATURATION: 98 %

## 2020-02-21 DIAGNOSIS — R21 RASH: ICD-10-CM

## 2020-02-21 DIAGNOSIS — M79.671 BILATERAL FOOT PAIN: ICD-10-CM

## 2020-02-21 DIAGNOSIS — M79.642 BILATERAL HAND PAIN: ICD-10-CM

## 2020-02-21 DIAGNOSIS — Z86.19 HISTORY OF HEPATITIS C: ICD-10-CM

## 2020-02-21 DIAGNOSIS — Z86.718 HISTORY OF DVT (DEEP VEIN THROMBOSIS): ICD-10-CM

## 2020-02-21 DIAGNOSIS — M79.672 BILATERAL FOOT PAIN: ICD-10-CM

## 2020-02-21 DIAGNOSIS — F11.21 HEROIN USE DISORDER, SEVERE, IN SUSTAINED REMISSION (HCC): ICD-10-CM

## 2020-02-21 DIAGNOSIS — M79.641 BILATERAL HAND PAIN: ICD-10-CM

## 2020-02-21 DIAGNOSIS — M25.649 MORNING JOINT STIFFNESS OF HAND, UNSPECIFIED LATERALITY: ICD-10-CM

## 2020-02-21 PROCEDURE — 99213 OFFICE O/P EST LOW 20 MIN: CPT | Performed by: INTERNAL MEDICINE

## 2020-02-21 PROCEDURE — 99204 OFFICE O/P NEW MOD 45 MIN: CPT | Performed by: INTERNAL MEDICINE

## 2020-02-21 RX ORDER — IBUPROFEN 600 MG/1
600 TABLET ORAL EVERY 6 HOURS PRN
Qty: 30 TAB | Refills: 3 | Status: SHIPPED
Start: 2020-02-21 | End: 2020-03-06

## 2020-02-21 RX ORDER — BUPRENORPHINE HYDROCHLORIDE AND NALOXONE HYDROCHLORIDE DIHYDRATE 8; 2 MG/1; MG/1
1 TABLET SUBLINGUAL DAILY
COMMUNITY

## 2020-02-21 RX ORDER — TRIAMCINOLONE ACETONIDE 1 MG/G
OINTMENT TOPICAL
Qty: 30 G | Refills: 1 | Status: SHIPPED | OUTPATIENT
Start: 2020-02-21

## 2020-02-21 ASSESSMENT — PATIENT HEALTH QUESTIONNAIRE - PHQ9: CLINICAL INTERPRETATION OF PHQ2 SCORE: 0

## 2020-02-21 ASSESSMENT — PAIN SCALES - GENERAL: PAINLEVEL: NO PAIN

## 2020-02-21 NOTE — ASSESSMENT & PLAN NOTE
She reports that for the past 3 months, she has noticed an itchy red rash over her left flank.  She has had similar rash on her arms about a year and a half ago.  States that it has been a [place] with a central clearing.  She tried an antifungal cream for a month with no improvement.  She states it is extremely itchy.  She denies any similar rashes currently, no changes in skin care products.

## 2020-02-21 NOTE — ASSESSMENT & PLAN NOTE
She reports that for the past year, she has had bilateral hand pain.  She states that every morning when she wakes up she has swelling in her MCP and DIP joints.  In particular, the first and second fingers seem most affected.  She cannot make a full fist for about 3 to 4 hours.  She has noticed some improvement with ibuprofen and naproxen.  She does not take this on a regular basis.  After several hours, she is able to use her hands normally and she reports that the swelling does diminish.  States that her knuckles feel soft and squishy when they are swollen and very tender.  She also notices some pain in both of her wrists although less severe.  She has foot pain as discussed below.  Also reports hip pain and occasional knee pain.  Denies shoulder, elbow, ankle pain.

## 2020-02-21 NOTE — PROGRESS NOTES
Eleanor Burr is a 24 y.o. female here for swelling in her fingers and hands daily, rash on back, chronic medical problems as below, establish care  HPI: Previous PCP was [facility] before that  Rash  She reports that for the past 3 months, she has noticed an itchy red rash over her left flank.  She has had similar rash on her arms about a year and a half ago.  States that it has been a [place] with a central clearing.  She tried an antifungal cream for a month with no improvement.  She states it is extremely itchy.  She denies any similar rashes currently, no changes in skin care products.     Bilateral hand pain  She reports that for the past year, she has had bilateral hand pain.  She states that every morning when she wakes up she has swelling in her MCP and DIP joints.  In particular, the first and second fingers seem most affected.  She cannot make a full fist for about 3 to 4 hours.  She has noticed some improvement with ibuprofen and naproxen.  She does not take this on a regular basis.  After several hours, she is able to use her hands normally and she reports that the swelling does diminish.  States that her knuckles feel soft and squishy when they are swollen and very tender.  She also notices some pain in both of her wrists although less severe.  She has foot pain as discussed below.  Also reports hip pain and occasional knee pain.  Denies shoulder, elbow, ankle pain.    History of DVT (deep vein thrombosis)  She has a history of DVT in 2018.  It was in her left arm.  She took Xarelto for 3 months and then stopped it.  She has not had any further episodes of blood clots.  She believes her grandmother had a DVT.  She does not have any testing for hypercoagulability in our system.  She does not believe she is had this testing done before.  She was not using drugs at the time she got the DVT.    History of hepatitis C  She has a history of hepatitis C.  Last labs were checked in June  2018 and are available in media.  She had a hep C viral load checked at that time which was undetectable.  She never got treated for the hep C.    Bilateral foot pain  States that about a year and a half ago, she was having severe pain in both of her feet with swelling of the feet.  She has noticed progression of her bunions bilaterally and has tenderness especially over the first MTP joint bilaterally.  She reports redness in this joint.  No matter what type of shoes she wears her feet hurt.    Heroin use disorder, severe, in sustained remission (HCC)  She has a history of heroin dependence.  She used for approximately 2 years from 20 15-20 17.  She has been sober since then and has been managed with Suboxone.  She currently takes 8 mg daily.  For her medication therapy, she is seeing a psychiatrist who is prescribing her Suboxone however she is paying $150 every 2 weeks to be seen as well as $120 a month with the medication and she can no longer afford this.  She is wondering if there is a provider who accepts her insurance who can take over her prescription.  She does have a history of IV use but has been tested for HIV and hepatitis after quitting in 2018.  She was negative for HIV.  See hepatitis discussion above.    Current medicines (including changes today)  Current Outpatient Medications   Medication Sig Dispense Refill   • buprenorphine-naloxone (SUBOXONE) 8-2 mg SL Tab SL Place 1 Tab under tongue every day.     • ibuprofen (MOTRIN) 600 MG Tab Take 1 Tab by mouth every 6 hours as needed for Moderate Pain or Inflammation. 30 Tab 3   • triamcinolone acetonide (KENALOG) 0.1 % Ointment Apply thin layer to rash on back twice daily as needed 30 g 1   • raNITidine (ZANTAC) 150 MG Tab Take 1 Tab by mouth 2 times a day. 60 Tab 11     No current facility-administered medications for this visit.      She  has a past medical history of Anxiety, Gastric ulcer, GERD (gastroesophageal reflux disease), History of DVT  (deep vein thrombosis), History of hepatitis C, Smoker, and Substance abuse (HCC).  She  has a past surgical history that includes tonsillectomy and adenoidectomy (1/3/2015) and dental extraction(s).  Social History     Tobacco Use   • Smoking status: Former Smoker     Packs/day: 1.00     Years: 3.00     Pack years: 3.00     Types: Cigarettes     Start date: 1/21/2019   • Smokeless tobacco: Never Used   Substance Use Topics   • Alcohol use: No   • Drug use: No     Comment: history of heroin IV x2 years, quit 2017     Social History     Social History Narrative   • Not on file     Family History   Problem Relation Age of Onset   • Cancer Mother         thyroid   • Cancer Father         throat   • Diabetes Sister    • Lupus Maternal Grandmother    • Lupus Paternal Aunt    • Heart Disease Neg Hx    • Stroke Neg Hx    • Hypertension Neg Hx          ROS  As above in HPI  All other systems reviewed and are negative     Objective:     Vitals:    02/21/20 1322   BP: (!) 98/56   Pulse: 68   Resp: 16   Temp: 36.8 °C (98.3 °F)   SpO2: 98%     Body mass index is 16.6 kg/m².  Physical Exam:    Constitutional: Alert, no distress.  Skin: Warm, dry, good turgor, slightly erythematous flat irregular ring shaped lesion on left flank approximately 6 cm x 4 cm.  Eye: Equal, round and reactive, conjunctiva clear, lids normal.  ENMT: Lips without lesions, dentures, oropharynx clear, TM's clear bilaterally.  Neck: Trachea midline, no masses, no thyromegaly. No cervical or supraclavicular lymphadenopathy.  Respiratory: Unlabored respiratory effort, lungs clear to auscultation, no wheezes, no ronchi.  Cardiovascular: Regular rate and rhythm, no murmurs appreciated, no lower extremity edema.  Abdomen: Soft, non-tender, no masses, no hepatosplenomegaly.  Psych: Alert and oriented x3, normal affect and mood.  MSK:  Hands: First and second MCP joints bilaterally are tender and swollen, tenderness and swelling of first and second PIP joints  bilaterally, able to make a full fist, some mild swelling of left wrist with tenderness to palpation  Feet: Significant bunions bilaterally with tenderness to palpation and swelling of first MTP joints bilaterally      Assessment and Plan:   The following treatment plan was discussed    1. Rash  Looks like it may represent nummular eczema.  Did not respond to antifungals x1 month of treatment so low suspicion for ringworm.  We will treat with topical corticosteroid.  If no improvement we might have to increase the potency.  - triamcinolone acetonide (KENALOG) 0.1 % Ointment; Apply thin layer to rash on back twice daily as needed  Dispense: 30 g; Refill: 1    2. Bilateral hand pain  I am concerned that she may have an autoimmune arthritis given her morning stiffness as well as her physical exam.  We will obtain labs as below.  I have refilled her ibuprofen for as needed use.  I have also ordered a hand x-ray.  Pending results, would consider rheum referral  - RHEUMATOID ARTHRITIS FACTOR; Future  - CHANDNI REFLEXIVE PROFILE; Future  - CCP ANTIBODY; Future  - ANTI-DNA (DS); Future  - Sed Rate; Future  - CRP HIGH SENSITIVE (CARDIAC); Future  - DX-JOINT SURVEY-HANDS SINGLE VIEW; Future  - COMPLEMENT C3+C4 SERUM; Future  - ibuprofen (MOTRIN) 600 MG Tab; Take 1 Tab by mouth every 6 hours as needed for Moderate Pain or Inflammation.  Dispense: 30 Tab; Refill: 3    3. Morning joint stiffness of hand, unspecified laterality  - RHEUMATOID ARTHRITIS FACTOR; Future  - CHANDNI REFLEXIVE PROFILE; Future  - CCP ANTIBODY; Future  - ANTI-DNA (DS); Future  - Sed Rate; Future  - CRP HIGH SENSITIVE (CARDIAC); Future  - DX-JOINT SURVEY-HANDS SINGLE VIEW; Future  - COMPLEMENT C3+C4 SERUM; Future    4. History of DVT (deep vein thrombosis)  She has never had a hypercoagulability work-up.  I have ordered labs today.  - APTT; Future  - AT-III FUNCTIONAL; Future  - FACTOR V LEIDEN MUTATION; Future  - FACTOR II DNA ANALYSIS; Future  - PROTEIN C  FUNCTIONAL; Future  - PROTEIN S FUNCTIONAL; Future    5. Bilateral foot pain  Again, some concern for autoimmune arthritis.  She also has severe bunions.  We will get x-rays of her feet as initial work-up in addition to labs as discussed above  - ibuprofen (MOTRIN) 600 MG Tab; Take 1 Tab by mouth every 6 hours as needed for Moderate Pain or Inflammation.  Dispense: 30 Tab; Refill: 3  - DX-JOINT SURVEY-FEET SINGLE VIEW; Future    6. History of hepatitis C  Appears to have cleared the virus on her own without treatment.  No further labs indicated.    7. Heroin use disorder, severe, in sustained remission (HCC)  Stable on Suboxone however she cannot afford to see her current psychiatrist.  I have placed a referral to Dr. Reyes who I believe prescribes Suboxone and is covered by Medicaid however patient was advised to follow-up with referrals department regarding this.  - buprenorphine-naloxone (SUBOXONE) 8-2 mg SL Tab SL; Place 1 Tab under tongue every day.  - REFERRAL TO PSYCHIATRY        Followup: Return in about 4 weeks (around 3/20/2020), or if symptoms worsen or fail to improve, for PAP, labs, rash.

## 2020-02-21 NOTE — ASSESSMENT & PLAN NOTE
She has a history of heroin dependence.  She used for approximately 2 years from 20 15-20 17.  She has been sober since then and has been managed with Suboxone.  She currently takes 8 mg daily.  For her medication therapy, she is seeing a psychiatrist who is prescribing her Suboxone however she is paying $150 every 2 weeks to be seen as well as $120 a month with the medication and she can no longer afford this.  She is wondering if there is a provider who accepts her insurance who can take over her prescription.  She does have a history of IV use but has been tested for HIV and hepatitis after quitting in 2018.  She was negative for HIV.  See hepatitis discussion above.

## 2020-02-21 NOTE — ASSESSMENT & PLAN NOTE
She has a history of DVT in 2018.  It was in her left arm.  She took Xarelto for 3 months and then stopped it.  She has not had any further episodes of blood clots.  She believes her grandmother had a DVT.  She does not have any testing for hypercoagulability in our system.  She does not believe she is had this testing done before.

## 2020-02-21 NOTE — ASSESSMENT & PLAN NOTE
States that about a year and a half ago, she was having severe pain in both of her feet with swelling of the feet.  She has noticed progression of her bunions bilaterally and has tenderness especially over the first MTP joint bilaterally.  She reports redness in this joint.  No matter what type of shoes she wears her feet hurt.

## 2020-02-21 NOTE — ASSESSMENT & PLAN NOTE
She has a history of hepatitis C.  Last labs were checked in June 2018 and are available in media.  She had a hep C viral load checked at that time which was undetectable.  She never got treated for the hep C.

## 2020-02-27 ENCOUNTER — HOSPITAL ENCOUNTER (OUTPATIENT)
Dept: RADIOLOGY | Facility: MEDICAL CENTER | Age: 25
End: 2020-02-27
Attending: INTERNAL MEDICINE
Payer: MEDICAID

## 2020-02-27 DIAGNOSIS — M79.671 BILATERAL FOOT PAIN: ICD-10-CM

## 2020-02-27 DIAGNOSIS — M79.642 BILATERAL HAND PAIN: ICD-10-CM

## 2020-02-27 DIAGNOSIS — M25.649 MORNING JOINT STIFFNESS OF HAND, UNSPECIFIED LATERALITY: ICD-10-CM

## 2020-02-27 DIAGNOSIS — M79.641 BILATERAL HAND PAIN: ICD-10-CM

## 2020-02-27 DIAGNOSIS — M79.672 BILATERAL FOOT PAIN: ICD-10-CM

## 2020-02-27 PROCEDURE — 77077 JOINT SURVEY SINGLE VIEW: CPT

## 2020-03-01 LAB
ANA SER QL: NEGATIVE
CCP IGA+IGG SERPL IA-ACNC: 9 UNITS (ref 0–19)
CRP SERPL HS-MCNC: 1.16 MG/L (ref 0–3)
DSDNA AB SER-ACNC: <1 IU/ML (ref 0–9)
ERYTHROCYTE [SEDIMENTATION RATE] IN BLOOD BY WESTERGREN METHOD: 4 MM/HR (ref 0–32)
RHEUMATOID FACT SERPL-ACNC: <10 IU/ML (ref 0–13.9)

## 2020-03-06 ENCOUNTER — OFFICE VISIT (OUTPATIENT)
Dept: MEDICAL GROUP | Facility: MEDICAL CENTER | Age: 25
End: 2020-03-06
Attending: INTERNAL MEDICINE
Payer: MEDICAID

## 2020-03-06 VITALS
BODY MASS INDEX: 16.95 KG/M2 | SYSTOLIC BLOOD PRESSURE: 100 MMHG | HEART RATE: 72 BPM | TEMPERATURE: 97.9 F | RESPIRATION RATE: 16 BRPM | HEIGHT: 67 IN | WEIGHT: 108 LBS | OXYGEN SATURATION: 97 % | DIASTOLIC BLOOD PRESSURE: 68 MMHG

## 2020-03-06 DIAGNOSIS — M79.642 BILATERAL HAND PAIN: ICD-10-CM

## 2020-03-06 DIAGNOSIS — R21 RASH: ICD-10-CM

## 2020-03-06 DIAGNOSIS — M79.641 BILATERAL HAND PAIN: ICD-10-CM

## 2020-03-06 DIAGNOSIS — Z86.718 HISTORY OF DVT (DEEP VEIN THROMBOSIS): ICD-10-CM

## 2020-03-06 PROCEDURE — 99213 OFFICE O/P EST LOW 20 MIN: CPT | Performed by: INTERNAL MEDICINE

## 2020-03-06 PROCEDURE — 99214 OFFICE O/P EST MOD 30 MIN: CPT | Performed by: INTERNAL MEDICINE

## 2020-03-06 RX ORDER — MELOXICAM 7.5 MG/1
7.5 TABLET ORAL DAILY
Qty: 30 TAB | Refills: 2 | Status: SHIPPED | OUTPATIENT
Start: 2020-03-06

## 2020-03-06 ASSESSMENT — FIBROSIS 4 INDEX: FIB4 SCORE: .8363636363636363636

## 2020-03-06 ASSESSMENT — PAIN SCALES - GENERAL: PAINLEVEL: NO PAIN

## 2020-03-06 NOTE — PROGRESS NOTES
Subjective:   Eleanor Burr is a 24 y.o. female here today for review labs, follow-up rash    Bilateral hand pain  She presents today for follow-up of her labs.  Rheumatoid factor, CHANDNI, anti-CCP, ESR, CRP, and anti-double-stranded DNA all came back within normal limits.  Hand x-rays were negative for erosive changes.  She continues to have significant pain and swelling in her first 2 fingers bilaterally with difficulty making a fist in the mornings for about 3 to 4 hours.  She brings in pictures of her hands when they are swollen and the fingers are remarkably enlarged when compared to present.  She continues to report quite a bit of pain.  Her grandmother has rheumatoid arthritis and is taking meloxicam.  Patient would like to try this medication.  She does get some improvement with ibuprofen.    History of DVT (deep vein thrombosis)  She is a very difficult lab draw and unfortunately when she went to have blood work done, they were not able to get enough blood to run the hypercoagulability work-up.  She is planning to go back in several weeks for a repeat draw.    Rash  She reports an improvement in the rash on her side with the Kenalog cream.  States it has faded in color and it is less itchy but she does not believe it has completely resolved.  She is used the medication for about a week and a half.         Current medicines (including changes today)  Current Outpatient Medications   Medication Sig Dispense Refill   • meloxicam (MOBIC) 7.5 MG Tab Take 1 Tab by mouth every day. 30 Tab 2   • buprenorphine-naloxone (SUBOXONE) 8-2 mg SL Tab SL Place 1 Tab under tongue every day.     • triamcinolone acetonide (KENALOG) 0.1 % Ointment Apply thin layer to rash on back twice daily as needed 30 g 1   • raNITidine (ZANTAC) 150 MG Tab Take 1 Tab by mouth 2 times a day. 60 Tab 11     No current facility-administered medications for this visit.      She  has a past medical history of Anxiety, Gastric ulcer, GERD  (gastroesophageal reflux disease), History of DVT (deep vein thrombosis), History of hepatitis C, Smoker, and Substance abuse (HCC).    ROS   Denies chest pain, shortness of breath  As above in HPI     Objective:     Vitals:    03/06/20 1110   BP: 100/68   Pulse: 72   Resp: 16   Temp: 36.6 °C (97.9 °F)   SpO2: 97%     Body mass index is 16.91 kg/m².   Physical Exam:  Constitutional: Alert, no distress.  Skin: Warm, dry, good turgor, no rashes in visible areas.  Eye: Equal, round and reactive, conjunctiva clear, lids normal.  MSK: Some very mild swelling of MCP joints of first and second fingers bilaterally, able to make a full fist    Results and Imaging:      Ref. Range 2/27/2020 12:28   Sed Rate Westergren Latest Ref Range: 0 - 32 mm/hr 4   C-Reactive Protein Cardiac Latest Ref Range: 0.00 - 3.00 mg/L 1.16   Rheumatoid Factor -Neph- Latest Ref Range: 0.0 - 13.9 IU/mL <10.0   Anti-Dna -Ds Latest Ref Range: 0 - 9 IU/mL <1   CCP Antibody IGG/IGA Latest Ref Range: 0 - 19 units 9   Antinuclear Antibody Direct Latest Ref Range: Negative  Negative     X ray hands (2/27/20):  FINDINGS:  The joint spaces are preserved. No bony erosions or subluxations. No osteophyte formation. No soft tissue calcifications.     IMPRESSION:     Unremarkable examination.        X ray feet (2/27/20):  FINDINGS:  There is bilateral hallux valgus. The joint spaces are preserved. No bony erosions or soft tissue calcifications.     IMPRESSION:     Bilateral hallux valgus.     Otherwise unremarkable examination.      Assessment and Plan:   The following treatment plan was discussed    1. Bilateral hand pain  Her symptoms are still very classic for RA although her labs would suggest otherwise.  Question whether she might have seronegative RA versus alternative etiology.  We will start her on meloxicam for the next several weeks to see if this makes a difference.    - meloxicam (MOBIC) 7.5 MG Tab; Take 1 Tab by mouth every day.  Dispense: 30 Tab;  Refill: 2  -Use daily for the next 2 to 3 weeks and then follow-up in clinic    2. History of DVT (deep vein thrombosis)  Work-up is pending at next blood draw.  We will review the labs when able    3. Rash  Improving with the Kenalog.  May represent nummular eczema in which case it could take more than 2 weeks to fully resolve.  Because the area is on her torso and not extremely sensitive, discussed that she can continue to use the cream for an additional week.        Followup: Return in about 3 weeks (around 3/27/2020), or if symptoms worsen or fail to improve, for PAP, f/u hand pain.

## 2020-03-06 NOTE — ASSESSMENT & PLAN NOTE
She presents today for follow-up of her labs.  Rheumatoid factor, CHANDNI, anti-CCP, ESR, CRP, and anti-double-stranded DNA all came back within normal limits.  Hand x-rays were negative for erosive changes.  She continues to have significant pain and swelling in her first 2 fingers bilaterally with difficulty making a fist in the mornings for about 3 to 4 hours.  She brings in pictures of her hands when they are swollen and the fingers are remarkably enlarged when compared to present.  She continues to report quite a bit of pain.  Her grandmother has rheumatoid arthritis and is taking meloxicam.  Patient would like to try this medication.  She does get some improvement with ibuprofen.

## 2020-03-06 NOTE — ASSESSMENT & PLAN NOTE
She is a very difficult lab draw and unfortunately when she went to have blood work done, they were not able to get enough blood to run the hypercoagulability work-up.  She is planning to go back in several weeks for a repeat draw.

## 2020-03-06 NOTE — ASSESSMENT & PLAN NOTE
She reports an improvement in the rash on her side with the Kenalog cream.  States it has faded in color and it is less itchy but she does not believe it has completely resolved.  She is used the medication for about a week and a half.

## 2020-04-22 ENCOUNTER — TELEPHONE (OUTPATIENT)
Dept: MEDICAL GROUP | Facility: MEDICAL CENTER | Age: 25
End: 2020-04-22

## 2020-04-22 NOTE — TELEPHONE ENCOUNTER
Phone number is disconnected.  Emailed patient about no show to appointment today 4/22/20.  Explained this was her first no show and the no show policy.

## 2021-03-19 ENCOUNTER — TELEPHONE (OUTPATIENT)
Dept: MEDICAL GROUP | Facility: MEDICAL CENTER | Age: 26
End: 2021-03-19

## 2021-03-19 DIAGNOSIS — Z86.19 HISTORY OF HEPATITIS C: ICD-10-CM

## 2021-03-19 NOTE — TELEPHONE ENCOUNTER
1. Caller Name: Eleanor Burr                        Call Back Number: [phone] (home)       How would the patient prefer to be contacted with a response: [employer] message    Pt has set an appt for annual exam, Pt is asking to have full labs ordered with liver concerns.    Future Appointments       Provider Department Center    4/7/2021 1:00 PM Diana Hawkins M.D. [facility]         Pt would like [employer] notice of her labs, in hopes of getting them drawn before the appt to have information to go over at her Virtual visit.

## 2021-04-07 ENCOUNTER — APPOINTMENT (OUTPATIENT)
Dept: MEDICAL GROUP | Facility: MEDICAL CENTER | Age: 26
End: 2021-04-07
Attending: INTERNAL MEDICINE
Payer: MEDICAID

## 2022-11-14 ENCOUNTER — HOSPITAL ENCOUNTER (OUTPATIENT)
Dept: RADIOLOGY | Facility: MEDICAL CENTER | Age: 27
End: 2022-11-14
Attending: FAMILY MEDICINE
Payer: COMMERCIAL

## 2022-11-14 DIAGNOSIS — R07.89 TIGHT CHEST: ICD-10-CM

## 2022-11-14 PROCEDURE — 71046 X-RAY EXAM CHEST 2 VIEWS: CPT

## 2023-03-18 ENCOUNTER — HOSPITAL ENCOUNTER (OUTPATIENT)
Dept: RADIOLOGY | Facility: MEDICAL CENTER | Age: 28
End: 2023-03-18
Attending: STUDENT IN AN ORGANIZED HEALTH CARE EDUCATION/TRAINING PROGRAM
Payer: COMMERCIAL

## 2023-03-18 DIAGNOSIS — R74.01 ELEVATED LIVER TRANSAMINASE LEVEL: ICD-10-CM

## 2024-04-24 ENCOUNTER — APPOINTMENT (OUTPATIENT)
Dept: RADIOLOGY | Facility: MEDICAL CENTER | Age: 29
End: 2024-04-24
Payer: MEDICAID

## 2024-04-24 ENCOUNTER — APPOINTMENT (OUTPATIENT)
Dept: RADIOLOGY | Facility: MEDICAL CENTER | Age: 29
End: 2024-04-24
Attending: STUDENT IN AN ORGANIZED HEALTH CARE EDUCATION/TRAINING PROGRAM
Payer: MEDICAID

## 2024-04-24 ENCOUNTER — HOSPITAL ENCOUNTER (EMERGENCY)
Facility: MEDICAL CENTER | Age: 29
End: 2024-04-24
Attending: STUDENT IN AN ORGANIZED HEALTH CARE EDUCATION/TRAINING PROGRAM
Payer: MEDICAID

## 2024-04-24 VITALS
HEART RATE: 99 BPM | RESPIRATION RATE: 16 BRPM | WEIGHT: 136.02 LBS | BODY MASS INDEX: 21.35 KG/M2 | DIASTOLIC BLOOD PRESSURE: 76 MMHG | OXYGEN SATURATION: 99 % | TEMPERATURE: 98.6 F | SYSTOLIC BLOOD PRESSURE: 123 MMHG | HEIGHT: 67 IN

## 2024-04-24 DIAGNOSIS — R11.2 NAUSEA AND VOMITING, UNSPECIFIED VOMITING TYPE: ICD-10-CM

## 2024-04-24 DIAGNOSIS — Z34.90 INTRAUTERINE PREGNANCY: ICD-10-CM

## 2024-04-24 DIAGNOSIS — R74.01 TRANSAMINITIS: ICD-10-CM

## 2024-04-24 DIAGNOSIS — R10.33 PERIUMBILICAL ABDOMINAL PAIN: ICD-10-CM

## 2024-04-24 LAB
ALBUMIN SERPL BCP-MCNC: 4.7 G/DL (ref 3.2–4.9)
ALBUMIN/GLOB SERPL: 1.6 G/DL
ALP SERPL-CCNC: 61 U/L (ref 30–99)
ALT SERPL-CCNC: 276 U/L (ref 2–50)
ANION GAP SERPL CALC-SCNC: 16 MMOL/L (ref 7–16)
AST SERPL-CCNC: 143 U/L (ref 12–45)
B-HCG SERPL-ACNC: ABNORMAL MIU/ML (ref 0–5)
BASOPHILS # BLD AUTO: 0.3 % (ref 0–1.8)
BASOPHILS # BLD: 0.01 K/UL (ref 0–0.12)
BILIRUB SERPL-MCNC: 0.6 MG/DL (ref 0.1–1.5)
BUN SERPL-MCNC: 9 MG/DL (ref 8–22)
CALCIUM ALBUM COR SERPL-MCNC: 8.8 MG/DL (ref 8.5–10.5)
CALCIUM SERPL-MCNC: 9.4 MG/DL (ref 8.5–10.5)
CHLORIDE SERPL-SCNC: 99 MMOL/L (ref 96–112)
CO2 SERPL-SCNC: 20 MMOL/L (ref 20–33)
CREAT SERPL-MCNC: 0.53 MG/DL (ref 0.5–1.4)
EOSINOPHIL # BLD AUTO: 0.02 K/UL (ref 0–0.51)
EOSINOPHIL NFR BLD: 0.5 % (ref 0–6.9)
ERYTHROCYTE [DISTWIDTH] IN BLOOD BY AUTOMATED COUNT: 42.9 FL (ref 35.9–50)
GFR SERPLBLD CREATININE-BSD FMLA CKD-EPI: 129 ML/MIN/1.73 M 2
GLOBULIN SER CALC-MCNC: 3 G/DL (ref 1.9–3.5)
GLUCOSE SERPL-MCNC: 88 MG/DL (ref 65–99)
HCT VFR BLD AUTO: 41.4 % (ref 37–47)
HGB BLD-MCNC: 14.6 G/DL (ref 12–16)
IMM GRANULOCYTES # BLD AUTO: 0.01 K/UL (ref 0–0.11)
IMM GRANULOCYTES NFR BLD AUTO: 0.3 % (ref 0–0.9)
LIPASE SERPL-CCNC: 14 U/L (ref 11–82)
LYMPHOCYTES # BLD AUTO: 0.77 K/UL (ref 1–4.8)
LYMPHOCYTES NFR BLD: 19.8 % (ref 22–41)
MCH RBC QN AUTO: 30.3 PG (ref 27–33)
MCHC RBC AUTO-ENTMCNC: 35.3 G/DL (ref 32.2–35.5)
MCV RBC AUTO: 85.9 FL (ref 81.4–97.8)
MONOCYTES # BLD AUTO: 0.28 K/UL (ref 0–0.85)
MONOCYTES NFR BLD AUTO: 7.2 % (ref 0–13.4)
NEUTROPHILS # BLD AUTO: 2.8 K/UL (ref 1.82–7.42)
NEUTROPHILS NFR BLD: 71.9 % (ref 44–72)
NRBC # BLD AUTO: 0 K/UL
NRBC BLD-RTO: 0 /100 WBC (ref 0–0.2)
NUMBER OF RH DOSES IND 8505RD: NORMAL
PLATELET # BLD AUTO: 119 K/UL (ref 164–446)
PMV BLD AUTO: 10.4 FL (ref 9–12.9)
POTASSIUM SERPL-SCNC: 4.2 MMOL/L (ref 3.6–5.5)
PROT SERPL-MCNC: 7.7 G/DL (ref 6–8.2)
RBC # BLD AUTO: 4.82 M/UL (ref 4.2–5.4)
RH BLD: NORMAL
SODIUM SERPL-SCNC: 135 MMOL/L (ref 135–145)
WBC # BLD AUTO: 3.9 K/UL (ref 4.8–10.8)

## 2024-04-24 PROCEDURE — 83690 ASSAY OF LIPASE: CPT

## 2024-04-24 PROCEDURE — 36415 COLL VENOUS BLD VENIPUNCTURE: CPT

## 2024-04-24 PROCEDURE — 80053 COMPREHEN METABOLIC PANEL: CPT

## 2024-04-24 PROCEDURE — 85025 COMPLETE CBC W/AUTO DIFF WBC: CPT

## 2024-04-24 PROCEDURE — 76801 OB US < 14 WKS SINGLE FETUS: CPT

## 2024-04-24 PROCEDURE — 86901 BLOOD TYPING SEROLOGIC RH(D): CPT

## 2024-04-24 PROCEDURE — 99284 EMERGENCY DEPT VISIT MOD MDM: CPT

## 2024-04-24 PROCEDURE — 84702 CHORIONIC GONADOTROPIN TEST: CPT

## 2024-04-24 RX ORDER — ONDANSETRON 4 MG/1
4 TABLET, ORALLY DISINTEGRATING ORAL EVERY 6 HOURS PRN
Qty: 10 TABLET | Refills: 0 | Status: SHIPPED | OUTPATIENT
Start: 2024-04-24

## 2024-04-24 ASSESSMENT — PAIN DESCRIPTION - PAIN TYPE: TYPE: ACUTE PAIN

## 2024-04-25 NOTE — ED NOTES
"Bedside report received from off going RN/tech: Elsie, assumed care of patient.  POC discussed with patient. Call light within reach, all needs addressed at this time.       Fall risk interventions in place: Not Applicable (all applicable per [place] Fall risk assessment)   Continuous monitoring: Not Applicable   IVF/IV medications: Not Applicable   Oxygen: Room Air  Bedside sitter: Not Applicable   Isolation: Not Applicable    /76   Pulse 99   Temp 37 °C (98.6 °F) (Temporal)   Resp 16   Ht 1.702 m (5' 7\")   Wt 61.7 kg (136 lb 0.4 oz)   SpO2 99%  - RA  "

## 2024-04-25 NOTE — ED NOTES
called, stated CMP slightly hemolyzed and potassium was 4.2, ERP Dr. Monreal stated to have the blood work released, This RN relayed this immediately to the .

## 2024-04-25 NOTE — ED TRIAGE NOTES
"Chief Complaint   Patient presents with    Abdominal Pain     X 1 week: +N/V, -vaginal bleeding.   Pt had positive at home pregnancy test but has not seen OB yet, LMP 2/25       27 yo F to triage for above complaint. Abdominal pain protocol ordered.      Pt placed in lobby. Pt educated on triage process. Pt encouraged to alert staff for any changes.     Patient and staff wearing appropriate PPE    /76   Pulse 99   Temp 37 °C (98.6 °F) (Temporal)   Resp 16   Ht 1.702 m (5' 7\")   Wt 61.7 kg (136 lb 0.4 oz)   LMP 02/25/2024 (Approximate)   SpO2 99%   BMI 21.30 kg/m²     "

## 2024-04-25 NOTE — DISCHARGE INSTRUCTIONS
Regardless abnormal liver function tests do not think this is secondary to cholecystitis however should your pain worsen she return to the emergency department for reevaluation otherwise follow-up with her primary care physician for repeat liver function testing

## 2024-04-25 NOTE — ED PROVIDER NOTES
CHIEF COMPLAINT  Chief Complaint   Patient presents with    Abdominal Pain     X 1 week: +N/V, -vaginal bleeding.   Pt had positive at home pregnancy test but has not seen OB yet, LMP 2/25       LIMITATION TO HISTORY   Select:     CHE Burr is a 28 y.o. female who presents to the Emergency Department for evaluation of abdominal pain located periumbilical cramping in nature with some nausea and vomiting pain is not postprandial she is 9 weeks pregnant by dates of her last menstrual cycle however she gets irregular menstrual periods patient has a history of transaminitis reports she seen her primary care physician and believes she has had an ultrasound of the liver and gallbladder few years ago she denies any greasy food makes her pain much worse, denies urinary urgency frequency or dysuria she denies back pain or fever    OUTSIDE HISTORIAN(S):  Select:    EXTERNAL RECORDS REVIEWED  Select:       PAST MEDICAL HISTORY  Past Medical History:   Diagnosis Date    Anxiety     Gastric ulcer     GERD (gastroesophageal reflux disease)     History of DVT (deep vein thrombosis)     History of hepatitis C     Smoker     Substance abuse (HCC)      .    SURGICAL HISTORY  Past Surgical History:   Procedure Laterality Date    TONSILLECTOMY AND ADENOIDECTOMY  1/3/2015    Performed by Timothy Layne M.D. at SURGERY [facility] ORS    DENTAL EXTRACTION(S)           FAMILY HISTORY  Family History   Problem Relation Age of Onset    Cancer Mother         thyroid    Cancer Father         throat    Diabetes Sister     Lupus Maternal Grandmother     Lupus Paternal Aunt     Heart Disease Neg Hx     Stroke Neg Hx     Hypertension Neg Hx           SOCIAL HISTORY  Social History     Socioeconomic History    Marital status: Single     Spouse name: Not on file    Number of children: Not on file    Years of education: Not on file    Highest education level: Not on file   Occupational History    Not on file   Tobacco Use    Smoking  "status: Former     Current packs/day: 1.00     Average packs/day: 1 pack/day for 5.3 years (5.3 ttl pk-yrs)     Types: Cigarettes     Start date: 1/21/2019    Smokeless tobacco: Never   Vaping Use    Vaping Use: Every day   Substance and Sexual Activity    Alcohol use: No    Drug use: No     Comment: history of heroin IV x2 years, quit 2017    Sexual activity: Yes     Partners: Male   Other Topics Concern    Not on file   Social History Narrative    Not on file     Social Determinants of Health     Financial Resource Strain: Not on file   Food Insecurity: Not on file   Transportation Needs: Not on file   Physical Activity: Not on file   Stress: Not on file   Social Connections: Not on file   Intimate Partner Violence: Not on file   Housing Stability: Not on file         CURRENT MEDICATIONS  No current facility-administered medications on file prior to encounter.     Current Outpatient Medications on File Prior to Encounter   Medication Sig Dispense Refill    meloxicam (MOBIC) 7.5 MG Tab Take 1 Tab by mouth every day. 30 Tab 2    buprenorphine-naloxone (SUBOXONE) 8-2 mg SL Tab SL Place 1 Tab under tongue every day.      triamcinolone acetonide (KENALOG) 0.1 % Ointment Apply thin layer to rash on back twice daily as needed 30 g 1    raNITidine (ZANTAC) 150 MG Tab Take 1 Tab by mouth 2 times a day. 60 Tab 11           ALLERGIES  Allergies   Allergen Reactions    Penicillins Anaphylaxis and Rash     hives    Penicillin G Rash       PHYSICAL EXAM  VITAL SIGNS:/76   Pulse 99   Temp 37 °C (98.6 °F) (Temporal)   Resp 16   Ht 1.702 m (5' 7\")   Wt 61.7 kg (136 lb 0.4 oz)   LMP 02/25/2024 (Approximate)   SpO2 99%   BMI 21.30 kg/m²       GENERAL: Awake and alert  HEAD: Normocephalic and atraumatic  NECK: Normal range of motion, without meningismus  EYES: Pupils Equal, Round, Reactive to Light, extraocular movements intact, conjunctiva white  ENT: Mucous membranes moist, oropharynx clear  PULMONARY: Normal effort, " clear to auscultation  CARDIOVASCULAR: No murmurs, clicks or rubs, peripheral pulses 2+  ABDOMINAL: Soft, non-tender, no guarding or rigidity present, no pulsatile masses  BACK: no midline tenderness, no costovertebral tenderness  NEUROLOGICAL: Grossly non-focal neurological examination, speech normal, gait normal  EXTREMITIES: No edema, normal to inspection  SKIN: Warm and dry.  PSYCHIATRIC: Affect is appropriate    DIAGNOSTIC STUDIES / PROCEDURES  EKG  Intrauterine pregnancy    LABS  Labs Reviewed   CBC WITH DIFFERENTIAL - Abnormal; Notable for the following components:       Result Value    WBC 3.9 (*)     Platelet Count 119 (*)     Lymphocytes 19.80 (*)     Lymphs (Absolute) 0.77 (*)     All other components within normal limits   COMP METABOLIC PANEL - Abnormal; Notable for the following components:    AST(SGOT) 143 (*)     ALT(SGPT) 276 (*)     All other components within normal limits   HCG QUANTITATIVE - Abnormal; Notable for the following components:    Bhcg 32250.0 (*)     All other components within normal limits   LIPASE   RH TYPE FOR RHOGAM FROM E.D.   ESTIMATED GFR   URINALYSIS,CULTURE IF INDICATED         RADIOLOGY  I have independently interpreted the diagnostic imaging associated with this visit and am waiting the final reading from the radiologist.   My preliminary interpretation is as follows: Intrauterine pregnancy    COURSE & MEDICAL DECISION MAKING    ED COURSE:        INTERVENTIONS BY ME:  Medications - No data to display    Response on recheck:   patient reevaluated abdomen soft nontender she has a negative Garcia sign discussed her transaminitis discussed possible concern for gallbladder pathology patient feels she has no right upper quadrant pain discussed obtaining ultrasound here, given she has no pain here and her pain is not postprandial patient prefers to follow-up with primary care physician for repeat liver function test in the future return precautions provided should her pain  worsen  INITIAL ASSESSMENT, COURSE AND PLAN  Care Narrative:       Patient with positive pregnancy test presenting with nonspecific periumbilical abdominal pain well-appearing for the past few weeks with nausea and vomiting no right upper quadrant tenderness no postprandial pain patient with transaminitis this appears to be chronic in nature do not suspect acute cholecystitis.  Ultrasound with intrauterine pregnancy .upon my interpretation of this patients symptomatology, examination, lab and imaging studies performed today, this patient's presentation is not consistent with perforated viscus, gonadal torsion, ruptured abdominal aortic aneurysm, acute mesenteric ischemia, acute coronary syndrome, or a pulmonary embolism.  Unremarkable CBC    Satisfactory analgesia achieved in the emergency department, patient ambulatory, prescribed prescription for ondansetron blood work otherwise without actionable findings normal lipase  tolerating oral fluids, and agreeable with discharge treatment plan of care.           ADDITIONAL PROBLEM LIST    DISPOSITION AND DISCUSSIONS  Discussion of management with other [facility] or appropriate source(s): None       Decision tools and prescription drugs considered including, but not limited to: Prescribed ondansetron    FINAL DIAGNOSIS  1. Periumbilical abdominal pain    2. Intrauterine pregnancy    3. Transaminitis    4. Nausea and vomiting, unspecified vomiting type             Electronically signed by: Mj Monreal DO ,8:42 PM 04/24/24